# Patient Record
Sex: MALE | Race: WHITE | NOT HISPANIC OR LATINO | Employment: FULL TIME | URBAN - METROPOLITAN AREA
[De-identification: names, ages, dates, MRNs, and addresses within clinical notes are randomized per-mention and may not be internally consistent; named-entity substitution may affect disease eponyms.]

---

## 2017-02-06 ENCOUNTER — ALLSCRIPTS OFFICE VISIT (OUTPATIENT)
Dept: OTHER | Facility: OTHER | Age: 52
End: 2017-02-06

## 2017-02-17 ENCOUNTER — ALLSCRIPTS OFFICE VISIT (OUTPATIENT)
Dept: OTHER | Facility: OTHER | Age: 52
End: 2017-02-17

## 2017-06-16 ENCOUNTER — ALLSCRIPTS OFFICE VISIT (OUTPATIENT)
Dept: OTHER | Facility: OTHER | Age: 52
End: 2017-06-16

## 2017-11-16 ENCOUNTER — ALLSCRIPTS OFFICE VISIT (OUTPATIENT)
Dept: OTHER | Facility: OTHER | Age: 52
End: 2017-11-16

## 2017-11-16 DIAGNOSIS — J32.9 CHRONIC SINUSITIS: ICD-10-CM

## 2017-11-16 DIAGNOSIS — H81.10 BENIGN PAROXYSMAL VERTIGO: ICD-10-CM

## 2017-11-17 NOTE — PROGRESS NOTES
Assessment    1  Benign positional vertigo (386 11) (H81 10)   2  Chronic congestion of paranasal sinus (473 9) (J32 9)    Plan  Benign positional vertigo    · Meclizine HCl - 25 MG Oral Tablet; TAKE 1 TABLET 4 TIMES DAILY AS NEEDEDFOR DIZZINESS   · [address] - PT Evaluate and Treat Co-Management  *  Status:Active  Requested for: [id number]  Care Summary provided  : Yes  Chronic congestion of paranasal sinus    · PredniSONE 20 MG Oral Tablet; 4 tabs for three days 3 tabs for three days, 2 tabsfor three days, one tab for three days, 1/2 tab for 4 days   · * XR SINUSES ROUTINE 3+ VIEWS; Status:Active; Requested for:87Caq9980;    · 1 - Finn Fitzgerald MD, Calderon Dunn  (Otolaryngology) Co-Management  *  Status: Hold For - Scheduling Requested for: 30LAF3295  Care Summary provided  : Yes    Chief Complaint  vertigo attack      History of Present Illness  HPI: pt states hi svertigo is a problem againstates it was bad this amstates his ear has been bothering him states it has been hurting all year and the meds we have been giving him has not helpedboth ears are more of a tingle  does seem to suffer from chronic congestion is his head etc      Review of Systems   Constitutional: no fever or chills, feels well, no tiredness, no recent weight loss or weight gain  ENT: no complaints of earache, no loss of hearing, no nosebleeds or nasal discharge, no sore throat or hoarseness  Cardiovascular: no complaints of slow or fast heart rate, no chest pain, no palpitations, no leg claudication or lower extremity edema  Respiratory: no complaints of shortness of breath, no wheezing or cough, no dyspnea on exertion, no orthopnea or PND  Gastrointestinal: no complaints of abdominal pain, no constipation, no nausea or vomiting, no diarrhea or bloody stools  Genitourinary: no complaints of dysuria or incontinence, no hesitancy, no nocturia, no genital lesion, no inadequacy of penile erection    Musculoskeletal: no complaints of arthralgia, no myalgia, no joint swelling or stiffness, no limb pain or swelling  Integumentary: no complaints of skin rash or lesion, no itching or dry skin, no skin wounds  Active Problems  1  Acute URI (465 9) (J06 9)   2  Backache (724 5) (M54 9)   3  BMI 26 0-26 9,adult (V85 22) (Z68 26)   4  Chest pain (786 50) (R07 9)   5  Dysfunction of left eustachian tube (381 81) (H69 82)   6  Immunization due (V05 9) (Z23)   7  Insomnia (780 52) (G47 00)   8  Never A Smoker    Past Medical History  1  History of Acute bronchitis, unspecified organism (466 0) (J20 9)   2  History of acute otitis media (V12 49) (Z86 69)   3  History of influenza (V12 09) (Z87 09)   4  History of Otalgia, unspecified laterality (388 70) (H92 09)   5  Other acute sinusitis (461 8) (J01 80)  Active Problems And Past Medical History Reviewed: The active problems and past medical history were reviewed and updated today  Family History  Family History Reviewed: The family history was reviewed and updated today  Social History     · Never A Smoker  The social history was reviewed and updated today  Current Meds   1  Lisinopril 10 MG Oral Tablet; Take 1 tablet daily Recorded   2  Melatonin 5 MG Oral Capsule; TAKE AS DIRECTED; Therapy: 20JHB7096 to Recorded   3  Pravastatin Sodium 40 MG Oral Tablet; 1 DAILY Recorded    Allergies  1  No Known Drug Allergies    Vitals   Recorded: 77PFZ2785 02:07PM   Temperature 96 4 F   Heart Rate 84   Respiration 16   Systolic 896   Diastolic 84   Height 5 ft 8 in   Weight 176 lb    BMI Calculated 26 76   BSA Calculated 1 94       Physical Exam   Constitutional  General appearance: No acute distress, well appearing and well nourished  Eyes  Conjunctiva and lids: No swelling, erythema, or discharge  Pupils and irises: Equal, round and reactive to light     Ears, Nose, Mouth, and Throat  External inspection of ears and nose: Normal    Pulmonary  Auscultation of lungs: Clear to auscultation, equal breath sounds bilaterally, no wheezes, no rales, no rhonci  Cardiovascular  Auscultation of heart: Normal rate and rhythm, normal S1 and S2, without murmurs  Abdomen  Abdomen: Non-tender, no masses  Lymphatic  Palpation of lymph nodes in neck: No lymphadenopathy  Musculoskeletal  Gait and station: Normal    Neurologic  Cranial nerves: Cranial nerves 2-12 intact           Signatures   Electronically signed by : Ángela Weiner DO; Nov 16 2017  2:29PM EST                       (Author)

## 2017-11-20 ENCOUNTER — APPOINTMENT (OUTPATIENT)
Dept: RADIOLOGY | Facility: CLINIC | Age: 52
End: 2017-11-20
Payer: COMMERCIAL

## 2017-11-20 DIAGNOSIS — J32.9 CHRONIC SINUSITIS: ICD-10-CM

## 2017-11-20 PROCEDURE — 70220 X-RAY EXAM OF SINUSES: CPT

## 2018-01-12 VITALS
BODY MASS INDEX: 26.52 KG/M2 | WEIGHT: 175 LBS | TEMPERATURE: 95.8 F | RESPIRATION RATE: 20 BRPM | SYSTOLIC BLOOD PRESSURE: 122 MMHG | DIASTOLIC BLOOD PRESSURE: 84 MMHG | HEIGHT: 68 IN | HEART RATE: 68 BPM

## 2018-01-13 ENCOUNTER — ALLSCRIPTS OFFICE VISIT (OUTPATIENT)
Dept: OTHER | Facility: OTHER | Age: 53
End: 2018-01-13

## 2018-01-13 VITALS
BODY MASS INDEX: 26.07 KG/M2 | HEART RATE: 72 BPM | SYSTOLIC BLOOD PRESSURE: 116 MMHG | HEIGHT: 68 IN | DIASTOLIC BLOOD PRESSURE: 80 MMHG | TEMPERATURE: 97.2 F | RESPIRATION RATE: 16 BRPM | WEIGHT: 172 LBS

## 2018-01-13 VITALS
RESPIRATION RATE: 22 BRPM | TEMPERATURE: 97.4 F | HEART RATE: 78 BPM | HEIGHT: 68 IN | BODY MASS INDEX: 25.91 KG/M2 | DIASTOLIC BLOOD PRESSURE: 70 MMHG | SYSTOLIC BLOOD PRESSURE: 110 MMHG | WEIGHT: 171 LBS

## 2018-01-13 VITALS
HEART RATE: 84 BPM | RESPIRATION RATE: 16 BRPM | HEIGHT: 68 IN | WEIGHT: 176 LBS | SYSTOLIC BLOOD PRESSURE: 122 MMHG | BODY MASS INDEX: 26.67 KG/M2 | DIASTOLIC BLOOD PRESSURE: 84 MMHG | TEMPERATURE: 96.4 F

## 2018-01-13 NOTE — PROGRESS NOTES
Assessment    1  Acute bronchitis, unspecified organism (466 0) (J20 9)   2  Otalgia, unspecified laterality (388 70) (H92 09)   3  Immunization due (V05 9) (Z23)    Plan  Acute bronchitis, unspecified organism    · Follow Up if Not Better Evaluation and Treatment  Follow-up  Status: Complete  Done:  81AZR2993 08:50PM   · Avoid sun exposure ; Status:Complete;   Done: 91PKK2138   · Drink plenty of fluids while you are not feeling well ; Status:Complete;   Done: 12ZOF9006   · Shared Decision Making Aid given; Status:Complete;   Done: 06LCZ5152  Acute bronchitis, unspecified organism, Otalgia, unspecified laterality    · Promethazine-Codeine 6 25-10 MG/5ML Oral Syrup; TAKE 5 ML BY MOUTH AT  BEDTIME AS NEEDED   · Zithromax Z-Matt 250 MG Oral Tablet (Azithromycin); Take as directed  Immunization due    · Adacel 5-2-15 5 LF-MCG/0 5 Intramuscular Suspension    Discussion/Summary    Mucinex DM  sees a cardiologist for htn/chol f/u per pt  The patient was counseled regarding risk factor reductions, impressions, risks and benefits of treatment options  Chief Complaint  pt c/o cough and sore throat for the past two days  er/cma  History of Present Illness  HPI: 1 grandchild, 3m old  cough  st  2d  severe  raw  mucus is clear  no fever  no chills  aches       Review of Systems    Cardiovascular: no chest pain  Neurological: no dizziness  Active Problems    1  Backache (724 5) (M54 9)   2  Chest pain (786 50) (R07 9)   3  Influenza (487 1) (J11 1)    Family History  Family History Reviewed: The family history was reviewed and updated today  Social History    · Never A Smoker  The social history was reviewed and is unchanged  Current Meds   1  Lisinopril 10 MG Oral Tablet; Take 1 tablet daily Recorded   2  Pravastatin Sodium 40 MG Oral Tablet; 1 DAILY Recorded    Allergies    1   No Known Drug Allergies    Vitals   Recorded: 88MET7690 04:18PM   Temperature 98 3 F   Heart Rate 80   Respiration 20 Systolic 745   Diastolic 80   Height 5 ft 8 in   Weight 174 lb    BMI Calculated 26 46   BSA Calculated 1 93     Physical Exam    Constitutional   General appearance: No acute distress, well appearing and well nourished  Eyes   Conjunctiva and lids: No swelling, erythema, or discharge  Pupils and irises: Equal, round and reactive to light  Ears, Nose, Mouth, and Throat   External inspection of ears and nose: Normal     Otoscopic examination: Abnormal   The right tympanic membrane was not red, was not bulging and had an intact light reflex  The left tympanic membrane had a diminished light reflex, but was not red and was not bulging  Nasal mucosa, septum, and turbinates: Normal without edema or erythema  Oropharynx: Normal with no erythema, edema, exudate or lesions  Pulmonary   Respiratory effort: Abnormal   Respiratory Findings: barky cough, but no audible wheezing  Auscultation of lungs: Clear to auscultation, equal breath sounds bilaterally, no wheezes, no rales, no rhonci  Cardiovascular   Auscultation of heart: Normal rate and rhythm, normal S1 and S2, without murmurs  Examination of extremities for edema and/or varicosities: Normal     Carotid pulses: Normal     Abdomen   Abdomen: Non-tender, no masses  Lymphatic   Palpation of lymph nodes in neck: No lymphadenopathy  Musculoskeletal   Gait and station: Normal     Digits and nails: Normal without clubbing or cyanosis  Inspection/palpation of joints, bones, and muscles: Normal     Skin   Skin and subcutaneous tissue: Normal without rashes or lesions      Psychiatric   Mood and affect: Normal          Signatures   Electronically signed by : Nela Rogers DO; Jan 25 2016  8:51PM EST                       (Author)

## 2018-01-14 NOTE — PROGRESS NOTES
Assessment   1  Influenza-like illness (799 89) (R69)   2  BMI 26 0-26 9,adult (V85 22) (Z68 26)    Plan   Influenza-like illness    · Oseltamivir Phosphate 75 MG Oral Capsule (Tamiflu); TAKE 1 CAPSULE TWICE    DAILY   · Drink at least 6 glasses of water or juice a day ; Status:Complete;   Done: 61IKV3669    08:52PM   · Good hand washing is one of the best ways to control the spread of germs ;    Status:Complete;   Done: 74PJE2829 08:52PM   · The following home treatments may soothe a sore throat ; Status:Complete;   Done:    66NMK4286 08:52PM   · You may slowly resume your normal level of activity once you feel better ;    Status:Complete;   Done: 25LOW9378 08:52PM    Discussion/Summary   The patient was counseled regarding risk factor reductions,-- impressions,-- risks and benefits of treatment options  Possible side effects of new medications were reviewed with the patient/guardian today  The treatment plan was reviewed with the patient/guardian  The patient/guardian understands and agrees with the treatment plan      Provider Comments   Provider Comments:    otc cold meds bp      Chief Complaint   Sinus congestion, body achy and back pain  History of Present Illness   HPI: sweats aches badly ok sob wheezing cough st head congestion bp meds              Review of Systems        Cardiovascular: no chest pain  Neurological: no fainting  Active Problems   1  Acute URI (465 9) (J06 9)   2  Backache (724 5) (M54 9)   3  Benign positional vertigo (386 11) (H81 10)   4  BMI 26 0-26 9,adult (V85 22) (Z68 26)   5  Chest pain (786 50) (R07 9)   6  Chronic congestion of paranasal sinus (473 9) (J32 9)   7  Dysfunction of left eustachian tube (381 81) (H69 82)   8  Immunization due (V05 9) (Z23)   9  Insomnia (780 52) (G47 00)   10  Never A Smoker    Past Medical History   1  History of Acute bronchitis, unspecified organism (466 0) (J20 9)   2  History of acute otitis media (V12 49) (Z86 69)   3   History of influenza (V12 09) (Z87 09)   4  History of Otalgia, unspecified laterality (388 70) (H92 09)   5  Other acute sinusitis (461 8) (J01 80)    Social History    · Never A Smoker  The social history was reviewed and updated today  Current Meds    1  Lisinopril 10 MG Oral Tablet; Take 1 tablet daily Recorded   2  Melatonin 5 MG Oral Capsule; TAKE AS DIRECTED; Therapy: 69HME8613 to Recorded   3  Pravastatin Sodium 40 MG Oral Tablet; 1 DAILY Recorded    Allergies   1  No Known Drug Allergies    Vitals    Recorded: 60PNB0388 11:25AM   Temperature 98 7 F   Heart Rate 78   Respiration 22   Systolic 84   Diastolic 62   Height 5 ft 8 in   Weight 172 lb    BMI Calculated 26 15   BSA Calculated 1 92     Physical Exam        Constitutional      General appearance: No acute distress, well appearing and well nourished  Eyes      Conjunctiva and lids: No swelling, erythema, or discharge  Pupils and irises: Equal, round and reactive to light  Ears, Nose, Mouth, and Throat      External inspection of ears and nose: Normal        Otoscopic examination: Tympanic membrance translucent with normal light reflex  Canals patent without erythema  Nasal mucosa, septum, and turbinates: Normal without edema or erythema  Oropharynx: Normal with no erythema, edema, exudate or lesions  Pulmonary      Respiratory effort: No increased work of breathing or signs of respiratory distress  Auscultation of lungs: Clear to auscultation, equal breath sounds bilaterally, no wheezes, no rales, no rhonci  Cardiovascular      Auscultation of heart: Normal rate and rhythm, normal S1 and S2, without murmurs  Examination of extremities for edema and/or varicosities: Normal        Carotid pulses: Normal        Abdomen      Abdomen: Non-tender, no masses  Lymphatic      Palpation of lymph nodes in neck: No lymphadenopathy         Musculoskeletal      Gait and station: Normal        Digits and nails: Normal without clubbing or cyanosis  Skin      Skin and subcutaneous tissue: Normal without rashes or lesions  Psychiatric      Mood and affect: Normal           Message   Return to work or school:    Erick Cheng is under my professional care  He was seen in my office on 1/13/18  Excuse from work on 1/13 and 1/15/18                Signatures    Electronically signed by : Walter Vidal DO; Jan 13 2018  8:53PM EST                       (Author)

## 2018-01-23 VITALS
HEART RATE: 78 BPM | RESPIRATION RATE: 22 BRPM | DIASTOLIC BLOOD PRESSURE: 62 MMHG | TEMPERATURE: 98.7 F | BODY MASS INDEX: 26.07 KG/M2 | WEIGHT: 172 LBS | HEIGHT: 68 IN | SYSTOLIC BLOOD PRESSURE: 84 MMHG

## 2018-02-26 NOTE — MISCELLANEOUS
Message  Return to work or school:   Marcelo Olivares is under my professional care  He was seen in my office on 1/13/18  Excuse from work on 1/13 and 1/15/18               Signatures   Electronically signed by : Henry Brown DO; Jan 13 2018  8:53PM EST                       (Author)

## 2019-04-29 ENCOUNTER — OFFICE VISIT (OUTPATIENT)
Dept: PODIATRY | Facility: CLINIC | Age: 54
End: 2019-04-29
Payer: COMMERCIAL

## 2019-04-29 VITALS
RESPIRATION RATE: 16 BRPM | BODY MASS INDEX: 26.52 KG/M2 | HEART RATE: 70 BPM | HEIGHT: 68 IN | DIASTOLIC BLOOD PRESSURE: 81 MMHG | WEIGHT: 175 LBS | SYSTOLIC BLOOD PRESSURE: 117 MMHG

## 2019-04-29 DIAGNOSIS — M79.672 LEFT FOOT PAIN: ICD-10-CM

## 2019-04-29 DIAGNOSIS — M20.42 HAMMER TOE OF LEFT FOOT: ICD-10-CM

## 2019-04-29 DIAGNOSIS — M21.962 ACQUIRED DEFORMITY OF LEFT FOOT: Primary | ICD-10-CM

## 2019-04-29 DIAGNOSIS — B07.0 PLANTAR WARTS: ICD-10-CM

## 2019-04-29 PROCEDURE — 99203 OFFICE O/P NEW LOW 30 MIN: CPT | Performed by: PODIATRIST

## 2019-04-29 PROCEDURE — 73620 X-RAY EXAM OF FOOT: CPT | Performed by: PODIATRIST

## 2019-04-29 RX ORDER — PRAVASTATIN SODIUM 80 MG/1
TABLET ORAL
Refills: 3 | COMMUNITY
Start: 2019-04-09

## 2019-04-29 RX ORDER — LISINOPRIL 10 MG/1
1 TABLET ORAL DAILY
COMMUNITY

## 2019-05-06 ENCOUNTER — OFFICE VISIT (OUTPATIENT)
Dept: PODIATRY | Facility: CLINIC | Age: 54
End: 2019-05-06
Payer: COMMERCIAL

## 2019-05-06 VITALS — HEIGHT: 68 IN | RESPIRATION RATE: 17 BRPM | BODY MASS INDEX: 26.52 KG/M2 | WEIGHT: 175 LBS

## 2019-05-06 DIAGNOSIS — M20.42 HAMMER TOE OF LEFT FOOT: ICD-10-CM

## 2019-05-06 DIAGNOSIS — M21.962 ACQUIRED DEFORMITY OF LEFT FOOT: ICD-10-CM

## 2019-05-06 DIAGNOSIS — M79.672 LEFT FOOT PAIN: ICD-10-CM

## 2019-05-06 DIAGNOSIS — B07.0 PLANTAR WARTS: ICD-10-CM

## 2019-05-06 DIAGNOSIS — L02.612 ABSCESS OF TOE OF LEFT FOOT: Primary | ICD-10-CM

## 2019-05-06 PROCEDURE — 99214 OFFICE O/P EST MOD 30 MIN: CPT | Performed by: PODIATRIST

## 2020-03-25 ENCOUNTER — TELEMEDICINE (OUTPATIENT)
Dept: FAMILY MEDICINE CLINIC | Facility: CLINIC | Age: 55
End: 2020-03-25
Payer: COMMERCIAL

## 2020-03-25 DIAGNOSIS — E78.49 OTHER HYPERLIPIDEMIA: ICD-10-CM

## 2020-03-25 DIAGNOSIS — R42 VERTIGO: Primary | ICD-10-CM

## 2020-03-25 DIAGNOSIS — I10 ESSENTIAL HYPERTENSION: ICD-10-CM

## 2020-03-25 PROCEDURE — G2012 BRIEF CHECK IN BY MD/QHP: HCPCS | Performed by: FAMILY MEDICINE

## 2020-03-25 RX ORDER — SCOLOPAMINE TRANSDERMAL SYSTEM 1 MG/1
PATCH, EXTENDED RELEASE TRANSDERMAL
Qty: 8 PATCH | Refills: 0 | Status: SHIPPED | OUTPATIENT
Start: 2020-03-25 | End: 2022-01-10

## 2020-03-25 RX ORDER — METHYLPREDNISOLONE 4 MG/1
TABLET ORAL
Qty: 21 TABLET | Refills: 0 | Status: SHIPPED | OUTPATIENT
Start: 2020-03-25 | End: 2020-03-31

## 2020-03-25 NOTE — PROGRESS NOTES
Virtual Regular Visit    Problem List Items Addressed This Visit        Active Problems    Essential hypertension    Other hyperlipidemia    Vertigo - Primary    Relevant Medications    methylPREDNISolone 4 MG tablet therapy pack    scopolamine (TRANSDERM-SCOP) 1 5 mg/3 days TD 72 hr patch               Reason for visit is illness    Encounter provider Twyla An DO    Provider located at [address]  [address] [facility]  [address]  [place] [address]      Recent Visits  No visits were found meeting these conditions  Showing recent visits within past 7 days and meeting all other requirements     Future Appointments  No visits were found meeting these conditions  Showing future appointments within next 150 days and meeting all other requirements        After connecting through [email or web address], the patient was identified by name and date of birth  Ranjit Mora was informed that this is a telemedicine visit and that the visit is being conducted through [employer] which may not be secure and therefore, might not be HIPAA-compliant  My office door was closed  No one else was in the room  He acknowledged consent and understanding of privacy and security of the video platform  The patient has agreed to participate and understands they can discontinue the visit at any time      Subjective  Ranjit Mora is a 63 y o  male with dizziness  Laying on couch    3d-4d  Dizzy  Headache  Some fever per pt  No thermometer  Some chills  No cough or sore throat   Discomfort with breathing  No wheezing  Speaking full sentences  Has vertigo, once a year  Transderm scop in patch  On lisinopril 10mg/d  On pravachol 80mg/d  Has cardiologist  No other meds  Works as an , 1m away from home    No c/d  Some retch x1  No close contacts with coronavirus    Spinning when laying down and rolling over    Left ear pain for 2y  Has been to ENT  No trouble hearing    Home bp 120/78  Pulse 56    History reviewed  No pertinent past medical history  History reviewed  No pertinent surgical history  Current Outpatient Medications   Medication Sig Dispense Refill    lisinopril (ZESTRIL) 10 mg tablet Take 1 tablet by mouth daily      methylPREDNISolone 4 MG tablet therapy pack Use as directed on package 21 tablet 0    pravastatin (PRAVACHOL) 80 mg tablet   3    scopolamine (TRANSDERM-SCOP) 1 5 mg/3 days TD 72 hr patch Apply one patch to dry skin behind ear every 3 days 8 patch 0     No current facility-administered medications for this visit  No Known Allergies    Review of Systems   Constitutional: Negative for fever  Respiratory: Negative for shortness of breath  Neurological: Positive for dizziness  Physical Exam   Constitutional: He appears well-developed  No distress  Eyes: No scleral icterus  Neck:   No apparent neck stiffness   Pulmonary/Chest: Effort normal  No stridor  No respiratory distress  Speaks full sentences   Skin: He is not diaphoretic  No pallor  Psychiatric: He has a normal mood and affect  Thought content normal         1  Vertigo  -     methylPREDNISolone 4 MG tablet therapy pack; Use as directed on package  -     scopolamine (TRANSDERM-SCOP) 1 5 mg/3 days TD 72 hr patch; Apply one patch to dry skin behind ear every 3 days    2  Essential hypertension    3  Other hyperlipidemia      F/u if no better  Stress test 2y ago and echo last year normal, Dr Meghan Alfaro in [facility] if does get severe CP    Consider balance center referral in future if no better or for prevention, offered to pt when ready/safe    htn stable, cont meds and monitoring  On statin, seems to tolerate, gets from?  Cardiologist?

## 2021-04-23 ENCOUNTER — APPOINTMENT (EMERGENCY)
Dept: RADIOLOGY | Facility: HOSPITAL | Age: 56
End: 2021-04-23
Payer: COMMERCIAL

## 2021-04-23 ENCOUNTER — HOSPITAL ENCOUNTER (EMERGENCY)
Facility: HOSPITAL | Age: 56
Discharge: HOME | End: 2021-04-23
Attending: EMERGENCY MEDICINE | Admitting: EMERGENCY MEDICINE
Payer: COMMERCIAL

## 2021-04-23 VITALS
OXYGEN SATURATION: 98 % | HEART RATE: 68 BPM | DIASTOLIC BLOOD PRESSURE: 72 MMHG | RESPIRATION RATE: 18 BRPM | BODY MASS INDEX: 27.67 KG/M2 | SYSTOLIC BLOOD PRESSURE: 134 MMHG | TEMPERATURE: 99.4 F | WEIGHT: 182 LBS

## 2021-04-23 DIAGNOSIS — N39.0 UTI (URINARY TRACT INFECTION): ICD-10-CM

## 2021-04-23 DIAGNOSIS — R10.9 ABDOMINAL PAIN: Primary | ICD-10-CM

## 2021-04-23 LAB
ALBUMIN SERPL BCP-MCNC: 3.5 G/DL (ref 3.5–5)
ALP SERPL-CCNC: 73 U/L (ref 46–116)
ALT SERPL W P-5'-P-CCNC: 41 U/L (ref 12–78)
ANION GAP SERPL CALCULATED.3IONS-SCNC: 8 MMOL/L (ref 4–13)
AST SERPL W P-5'-P-CCNC: 20 U/L (ref 5–45)
BACTERIA UR QL AUTO: ABNORMAL /HPF
BASOPHILS # BLD AUTO: 0.03 THOUSANDS/ΜL (ref 0–0.1)
BASOPHILS NFR BLD AUTO: 1 % (ref 0–1)
BILIRUB SERPL-MCNC: 1.62 MG/DL (ref 0.2–1)
BILIRUB UR QL STRIP: NEGATIVE
BUN SERPL-MCNC: 18 MG/DL (ref 5–25)
CALCIUM SERPL-MCNC: 8.3 MG/DL (ref 8.3–10.1)
CHLORIDE SERPL-SCNC: 105 MMOL/L (ref 100–108)
CLARITY UR: CLEAR
CO2 SERPL-SCNC: 26 MMOL/L (ref 21–32)
COLOR UR: YELLOW
CREAT SERPL-MCNC: 1.12 MG/DL (ref 0.6–1.3)
EOSINOPHIL # BLD AUTO: 0.21 THOUSAND/ΜL (ref 0–0.61)
EOSINOPHIL NFR BLD AUTO: 3 % (ref 0–6)
ERYTHROCYTE [DISTWIDTH] IN BLOOD BY AUTOMATED COUNT: 12.2 % (ref 11.6–15.1)
GFR SERPL CREATININE-BSD FRML MDRD: 73 ML/MIN/1.73SQ M
GLUCOSE SERPL-MCNC: 99 MG/DL (ref 65–140)
GLUCOSE UR STRIP-MCNC: NEGATIVE MG/DL
HCT VFR BLD AUTO: 41.8 % (ref 36.5–49.3)
HGB BLD-MCNC: 14.4 G/DL (ref 12–17)
HGB UR QL STRIP.AUTO: ABNORMAL
IMM GRANULOCYTES # BLD AUTO: 0.04 THOUSAND/UL (ref 0–0.2)
IMM GRANULOCYTES NFR BLD AUTO: 1 % (ref 0–2)
KETONES UR STRIP-MCNC: NEGATIVE MG/DL
LEUKOCYTE ESTERASE UR QL STRIP: ABNORMAL
LIPASE SERPL-CCNC: 134 U/L (ref 73–393)
LYMPHOCYTES # BLD AUTO: 1.99 THOUSANDS/ΜL (ref 0.6–4.47)
LYMPHOCYTES NFR BLD AUTO: 32 % (ref 14–44)
MAGNESIUM SERPL-MCNC: 1.8 MG/DL (ref 1.6–2.6)
MCH RBC QN AUTO: 30 PG (ref 26.8–34.3)
MCHC RBC AUTO-ENTMCNC: 34.4 G/DL (ref 31.4–37.4)
MCV RBC AUTO: 87 FL (ref 82–98)
MONOCYTES # BLD AUTO: 0.79 THOUSAND/ΜL (ref 0.17–1.22)
MONOCYTES NFR BLD AUTO: 13 % (ref 4–12)
NEUTROPHILS # BLD AUTO: 3.25 THOUSANDS/ΜL (ref 1.85–7.62)
NEUTS SEG NFR BLD AUTO: 50 % (ref 43–75)
NITRITE UR QL STRIP: NEGATIVE
NON-SQ EPI CELLS URNS QL MICRO: ABNORMAL /HPF
NRBC BLD AUTO-RTO: 0 /100 WBCS
PH UR STRIP.AUTO: 6 [PH]
PLATELET # BLD AUTO: 257 THOUSANDS/UL (ref 149–390)
PMV BLD AUTO: 9.4 FL (ref 8.9–12.7)
POTASSIUM SERPL-SCNC: 3.7 MMOL/L (ref 3.5–5.3)
PROT SERPL-MCNC: 6.8 G/DL (ref 6.4–8.2)
PROT UR STRIP-MCNC: NEGATIVE MG/DL
RBC # BLD AUTO: 4.8 MILLION/UL (ref 3.88–5.62)
RBC #/AREA URNS AUTO: ABNORMAL /HPF
SODIUM SERPL-SCNC: 139 MMOL/L (ref 136–145)
SP GR UR STRIP.AUTO: 1.02 (ref 1–1.03)
UROBILINOGEN UR QL STRIP.AUTO: 2 E.U./DL
WBC # BLD AUTO: 6.31 THOUSAND/UL (ref 4.31–10.16)
WBC #/AREA URNS AUTO: ABNORMAL /HPF

## 2021-04-23 PROCEDURE — 87086 URINE CULTURE/COLONY COUNT: CPT | Performed by: EMERGENCY MEDICINE

## 2021-04-23 PROCEDURE — 99284 EMERGENCY DEPT VISIT MOD MDM: CPT

## 2021-04-23 PROCEDURE — 83735 ASSAY OF MAGNESIUM: CPT | Performed by: EMERGENCY MEDICINE

## 2021-04-23 PROCEDURE — 36415 COLL VENOUS BLD VENIPUNCTURE: CPT

## 2021-04-23 PROCEDURE — 80053 COMPREHEN METABOLIC PANEL: CPT

## 2021-04-23 PROCEDURE — 81001 URINALYSIS AUTO W/SCOPE: CPT | Performed by: EMERGENCY MEDICINE

## 2021-04-23 PROCEDURE — G1004 CDSM NDSC: HCPCS

## 2021-04-23 PROCEDURE — 96374 THER/PROPH/DIAG INJ IV PUSH: CPT

## 2021-04-23 PROCEDURE — 85025 COMPLETE CBC W/AUTO DIFF WBC: CPT

## 2021-04-23 PROCEDURE — 83690 ASSAY OF LIPASE: CPT

## 2021-04-23 PROCEDURE — 74177 CT ABD & PELVIS W/CONTRAST: CPT

## 2021-04-23 PROCEDURE — 99284 EMERGENCY DEPT VISIT MOD MDM: CPT | Performed by: EMERGENCY MEDICINE

## 2021-04-23 PROCEDURE — 96361 HYDRATE IV INFUSION ADD-ON: CPT

## 2021-04-23 RX ORDER — KETOROLAC TROMETHAMINE 30 MG/ML
15 INJECTION, SOLUTION INTRAMUSCULAR; INTRAVENOUS ONCE
Status: COMPLETED | OUTPATIENT
Start: 2021-04-23 | End: 2021-04-23

## 2021-04-23 RX ORDER — CEPHALEXIN 500 MG/1
500 CAPSULE ORAL EVERY 12 HOURS SCHEDULED
Qty: 10 CAPSULE | Refills: 0 | Status: SHIPPED | OUTPATIENT
Start: 2021-04-23 | End: 2021-04-28

## 2021-04-23 RX ORDER — CEPHALEXIN 500 MG/1
500 CAPSULE ORAL ONCE
Status: COMPLETED | OUTPATIENT
Start: 2021-04-23 | End: 2021-04-23

## 2021-04-23 RX ADMIN — SODIUM CHLORIDE 1000 ML: 0.9 INJECTION, SOLUTION INTRAVENOUS at 15:58

## 2021-04-23 RX ADMIN — CEPHALEXIN 500 MG: 500 CAPSULE ORAL at 17:41

## 2021-04-23 RX ADMIN — IOHEXOL 100 ML: 350 INJECTION, SOLUTION INTRAVENOUS at 16:11

## 2021-04-23 RX ADMIN — KETOROLAC TROMETHAMINE 15 MG: 30 INJECTION, SOLUTION INTRAMUSCULAR at 15:57

## 2021-04-23 NOTE — ED PROVIDER NOTES
History  Chief Complaint   Patient presents with    Abdominal Pain     Co of abd pain  intermittent diarrhea and vomiting for the past 2 weeks 2 fridays ago after J&J vaccine  Vomiting and nausea improving  Pain and diarrhea persisted  Pt in the ER with c/o left sided abd pain since Tuesday, associated with nausea/vomiting/non bloody diarrhea  He denies back pain or urinary symptoms  He denies sick contacts  No relief with 1 tab of Imodium taken today  Pt with c/o myalgias and fevers 2 days after he got the J & J vaccine 2 wks ago, but none since  He has a prior hx of HTN/hyperlipidemia  History provided by:  Patient   used: No    Abdominal Pain  Pain location:  LLQ  Pain quality: aching    Pain radiates to:  Does not radiate  Pain severity:  Moderate  Onset quality:  Sudden  Timing:  Constant  Progression:  Worsening  Chronicity:  New  Relieved by:  None tried  Worsened by:  Nothing  Ineffective treatments:  None tried  Associated symptoms: diarrhea, nausea and vomiting    Associated symptoms: no chest pain, no chills, no constipation, no cough, no dysuria, no fever, no hematuria and no shortness of breath        Prior to Admission Medications   Prescriptions Last Dose Informant Patient Reported? Taking?   lisinopril (ZESTRIL) 10 mg tablet 4/23/2021 at 0700  Yes Yes   Sig: Take 1 tablet by mouth daily   pravastatin (PRAVACHOL) 80 mg tablet 4/23/2021 at 0700  Yes Yes   scopolamine (TRANSDERM-SCOP) 1 5 mg/3 days TD 72 hr patch Not Taking at Unknown time  No No   Sig: Apply one patch to dry skin behind ear every 3 days   Patient not taking: Reported on 4/23/2021      Facility-Administered Medications: None       Past Medical History:   Diagnosis Date    High cholesterol     Hypertension        Past Surgical History:   Procedure Laterality Date    GALLBLADDER SURGERY      LEG SURGERY Right        History reviewed  No pertinent family history    I have reviewed and agree with the history as documented  E-Cigarette/Vaping    E-Cigarette Use Never User      E-Cigarette/Vaping Substances     Social History     Tobacco Use    Smoking status: Never Smoker    Smokeless tobacco: Never Used   Substance Use Topics    Alcohol use: Yes     Comment: occ    Drug use: Never       Review of Systems   Constitutional: Negative for chills and fever  HENT: Negative for congestion, drooling and trouble swallowing  Eyes: Negative for pain, redness and itching  Respiratory: Negative for cough, shortness of breath and wheezing  Cardiovascular: Negative for chest pain and palpitations  Gastrointestinal: Positive for abdominal pain, diarrhea, nausea and vomiting  Negative for constipation  Genitourinary: Negative for dysuria, flank pain, hematuria and urgency  Musculoskeletal: Negative for back pain  Skin: Negative for color change and rash  Psychiatric/Behavioral: Negative for agitation and confusion  All other systems reviewed and are negative  Physical Exam  Physical Exam  Vitals signs and nursing note reviewed  Constitutional:       Appearance: He is well-developed  HENT:      Head: Normocephalic and atraumatic  Eyes:      Pupils: Pupils are equal, round, and reactive to light  Cardiovascular:      Rate and Rhythm: Normal rate and regular rhythm  Heart sounds: Normal heart sounds  Pulmonary:      Effort: Pulmonary effort is normal       Breath sounds: Normal breath sounds  Abdominal:      General: Bowel sounds are normal  There is no distension  Palpations: Abdomen is soft  There is no mass  Tenderness: There is abdominal tenderness in the suprapubic area and left lower quadrant  There is no guarding or rebound  Hernia: No hernia is present  Skin:     General: Skin is warm and dry  Capillary Refill: Capillary refill takes less than 2 seconds  Neurological:      General: No focal deficit present        Mental Status: He is alert and oriented to person, place, and time  Psychiatric:         Behavior: Behavior normal          Thought Content: Thought content normal          Judgment: Judgment normal          Vital Signs  ED Triage Vitals [04/23/21 1519]   Temperature Pulse Respirations Blood Pressure SpO2   99 4 °F (37 4 °C) 86 20 139/84 99 %      Temp Source Heart Rate Source Patient Position - Orthostatic VS BP Location FiO2 (%)   Tympanic Monitor Lying Left arm --      Pain Score       5           Vitals:    04/23/21 1519 04/23/21 1700 04/23/21 1730   BP: 139/84 121/74 134/72   Pulse: 86 76 68   Patient Position - Orthostatic VS: Lying Lying          Visual Acuity      ED Medications  Medications   sodium chloride 0 9 % bolus 1,000 mL (0 mL Intravenous Stopped 4/23/21 1743)   ketorolac (TORADOL) injection 15 mg (15 mg Intravenous Given 4/23/21 1557)   iohexol (OMNIPAQUE) 350 MG/ML injection (SINGLE-DOSE) 100 mL (100 mL Intravenous Given 4/23/21 1611)   cephalexin (KEFLEX) capsule 500 mg (500 mg Oral Given 4/23/21 1741)       Diagnostic Studies  Results Reviewed     Procedure Component Value Units Date/Time    Urine culture [[id number]] Collected: 04/23/21 1600    Lab Status:  In process Specimen: Urine, Clean Catch Updated: 04/23/21 1730    Urine Microscopic [[id number]]  (Abnormal) Collected: 04/23/21 1600    Lab Status: Final result Specimen: Urine, Clean Catch Updated: 04/23/21 1622     RBC, UA 4-10 /hpf      WBC, UA 4-10 /hpf      Epithelial Cells None Seen /hpf      Bacteria, UA Occasional /hpf     Magnesium [[id number]]  (Normal) Collected: 04/23/21 1530    Lab Status: Final result Specimen: Blood from Arm, Right Updated: 04/23/21 1618     Magnesium 1 8 mg/dL     UA (URINE) with reflex to Scope [[id number]]  (Abnormal) Collected: 04/23/21 1600    Lab Status: Final result Specimen: Urine, Clean Catch Updated: 04/23/21 1606     Color, UA Yellow     Clarity, UA Clear     Specific Gravity, UA 1 025     pH, UA 6 0     Leukocytes, UA Small Nitrite, UA Negative     Protein, UA Negative mg/dl      Glucose, UA Negative mg/dl      Ketones, UA Negative mg/dl      Urobilinogen, UA 2 0 E U /dl      Bilirubin, UA Negative     Blood, UA Moderate    Comprehensive metabolic panel [[id number]]  (Abnormal) Collected: 04/23/21 1530    Lab Status: Final result Specimen: Blood from Arm, Right Updated: 04/23/21 1555     Sodium 139 mmol/L      Potassium 3 7 mmol/L      Chloride 105 mmol/L      CO2 26 mmol/L      ANION GAP 8 mmol/L      BUN 18 mg/dL      Creatinine 1 12 mg/dL      Glucose 99 mg/dL      Calcium 8 3 mg/dL      AST 20 U/L      ALT 41 U/L      Alkaline Phosphatase 73 U/L      Total Protein 6 8 g/dL      Albumin 3 5 g/dL      Total Bilirubin 1 62 mg/dL      eGFR 73 ml/min/1 73sq m     Narrative:      [facility] guidelines for Chronic Kidney Disease (CKD):     Stage 1 with normal or high GFR (GFR > 90 mL/min/1 73 square meters)    Stage 2 Mild CKD (GFR = 60-89 mL/min/1 73 square meters)    Stage 3A Moderate CKD (GFR = 45-59 mL/min/1 73 square meters)    Stage 3B Moderate CKD (GFR = 30-44 mL/min/1 73 square meters)    Stage 4 Severe CKD (GFR = 15-29 mL/min/1 73 square meters)    Stage 5 End Stage CKD (GFR <15 mL/min/1 73 square meters)  Note: GFR calculation is accurate only with a steady state creatinine    Lipase [[id number]]  (Normal) Collected: 04/23/21 1530    Lab Status: Final result Specimen: Blood from Arm, Right Updated: 04/23/21 1555     Lipase 134 u/L     CBC and differential [[id number]]  (Abnormal) Collected: 04/23/21 1530    Lab Status: Final result Specimen: Blood from Arm, Right Updated: 04/23/21 1536     WBC 6 31 Thousand/uL      RBC 4 80 Million/uL      Hemoglobin 14 4 g/dL      Hematocrit 41 8 %      MCV 87 fL      MCH 30 0 pg      MCHC 34 4 g/dL      RDW 12 2 %      MPV 9 4 fL      Platelets 888 Thousands/uL      nRBC 0 /100 WBCs      Neutrophils Relative 50 %      Immat GRANS % 1 %      Lymphocytes Relative 32 % Monocytes Relative 13 %      Eosinophils Relative 3 %      Basophils Relative 1 %      Neutrophils Absolute 3 25 Thousands/µL      Immature Grans Absolute 0 04 Thousand/uL      Lymphocytes Absolute 1 99 Thousands/µL      Monocytes Absolute 0 79 Thousand/µL      Eosinophils Absolute 0 21 Thousand/µL      Basophils Absolute 0 03 Thousands/µL                  CT abdomen pelvis with contrast   Final Result by Maria Luisa Perales MD (04/23 1658)      No acute CT findings  Workstation performed: [id number]                    Procedures  Procedures         ED Course                                           MDM  Number of Diagnoses or Management Options  Abdominal pain: new and requires workup  UTI (urinary tract infection): new and requires workup  Diagnosis management comments: Pt in the ER with c/o abd pain  Labs/CT reviewed  No acute intraabdominal pathology identified  +UTI - will start Keflex  Will d/c to home with outpt f/u with PCP and urology  Pt agrees with plan  Amount and/or Complexity of Data Reviewed  Clinical lab tests: ordered  Tests in the radiology section of CPT®: ordered and reviewed    Risk of Complications, Morbidity, and/or Mortality  Presenting problems: high  Diagnostic procedures: high  Management options: high    Patient Progress  Patient progress: improved      Disposition  Final diagnoses:   Abdominal pain   UTI (urinary tract infection)     Time reflects when diagnosis was documented in both MDM as applicable and the Disposition within this note     Time User Action Codes Description Comment    4/23/2021  5:16 PM [place] Bump O Add [R10 9] Abdominal pain     4/23/2021  5:17 PM [place] Bump O Add [N39 0] UTI (urinary tract infection)       ED Disposition     ED Disposition Condition Date/Time Comment    Discharge Stable Fri Apr 23, 2021  5:16 PM [facility] discharge to home/self care              Follow-up Information     Follow up With Specialties Details Why Contact Info Additional Information    Grzegorz Noyola DO Family Medicine, Wound Care Schedule an appointment as soon as possible for a visit in 2 days for follow up [facility] [address]       Bandar Presley MD Urology Schedule an appointment as soon as possible for a visit in 2 days for follow up [address] [facility] Schedule an appointment as soon as possible for a visit in 2 days for follow up [address]  [id number] [address] [facility] [place] [facility], [address] [place], [place], [address] [phone]          Discharge Medication List as of 4/23/2021  5:34 PM      START taking these medications    Details   cephalexin (KEFLEX) 500 mg capsule Take 1 capsule (500 mg total) by mouth every 12 (twelve) hours for 5 days, Starting Fri 4/23/2021, Until Wed 4/28/2021, Normal         CONTINUE these medications which have NOT CHANGED    Details   lisinopril (ZESTRIL) 10 mg tablet Take 1 tablet by mouth daily, Historical Med      pravastatin (PRAVACHOL) 80 mg tablet Starting Tue 4/9/2019, Historical Med      scopolamine (TRANSDERM-SCOP) 1 5 mg/3 days TD 72 hr patch Apply one patch to dry skin behind ear every 3 days, Normal           No discharge procedures on file      PDMP Review     None          ED Provider  Electronically Signed by           Wisam Falcon DO  04/24/21 5913

## 2021-04-23 NOTE — DISCHARGE INSTRUCTIONS
Return to the ER for further concerns or worsening symptoms  Follow up with your primary care physician, urology and gastroenterology in 1-2 days  Take medication as prescribed

## 2021-04-25 LAB — BACTERIA UR CULT: NORMAL

## 2021-04-26 ENCOUNTER — VBI (OUTPATIENT)
Dept: FAMILY MEDICINE CLINIC | Facility: CLINIC | Age: 56
End: 2021-04-26

## 2021-04-26 NOTE — TELEPHONE ENCOUNTER
Roseanna Islas    ED Visit Information     Ed visit date: 04/23/2021  Diagnosis Description: Abdominal pain; UTI (urinary tract infection)  In Network? Yes Verónica Munguia  Discharge status: Home  Discharged with meds ? Yes  Number of ED visits to date: 0  ED Severity:NA     Outreach Information    Outreach successful: Yes 3  Date letter mailed:NA  Date Finalized:04/27/2021    Care Coordination    Follow up appointment with pcp: no no  Transportation issues ?  NA    Value Base Outreach    04/26/2021 1:39 PM - [facility]  04/27/2021 10:07 AM  04/27/2021 1:38 PM

## 2021-12-20 ENCOUNTER — OFFICE VISIT (OUTPATIENT)
Dept: FAMILY MEDICINE CLINIC | Facility: CLINIC | Age: 56
End: 2021-12-20
Payer: COMMERCIAL

## 2021-12-20 VITALS
WEIGHT: 181 LBS | SYSTOLIC BLOOD PRESSURE: 118 MMHG | HEIGHT: 68 IN | HEART RATE: 94 BPM | BODY MASS INDEX: 27.43 KG/M2 | TEMPERATURE: 98.4 F | DIASTOLIC BLOOD PRESSURE: 80 MMHG | OXYGEN SATURATION: 98 % | RESPIRATION RATE: 16 BRPM

## 2021-12-20 DIAGNOSIS — B34.9 VIRAL INFECTION, UNSPECIFIED: Primary | ICD-10-CM

## 2021-12-20 PROBLEM — G47.00 INSOMNIA: Status: ACTIVE | Noted: 2017-02-06

## 2021-12-20 PROBLEM — J32.9 CHRONIC CONGESTION OF PARANASAL SINUS: Status: ACTIVE | Noted: 2017-11-16

## 2021-12-20 PROBLEM — H81.10 BENIGN POSITIONAL VERTIGO: Status: ACTIVE | Noted: 2017-11-16

## 2021-12-20 PROCEDURE — 87636 SARSCOV2 & INF A&B AMP PRB: CPT | Performed by: NURSE PRACTITIONER

## 2021-12-20 PROCEDURE — 3725F SCREEN DEPRESSION PERFORMED: CPT | Performed by: NURSE PRACTITIONER

## 2021-12-20 PROCEDURE — 3008F BODY MASS INDEX DOCD: CPT | Performed by: NURSE PRACTITIONER

## 2021-12-20 PROCEDURE — 99213 OFFICE O/P EST LOW 20 MIN: CPT | Performed by: NURSE PRACTITIONER

## 2021-12-20 PROCEDURE — 1036F TOBACCO NON-USER: CPT | Performed by: NURSE PRACTITIONER

## 2021-12-22 ENCOUNTER — TELEPHONE (OUTPATIENT)
Dept: FAMILY MEDICINE CLINIC | Facility: CLINIC | Age: 56
End: 2021-12-22

## 2021-12-22 LAB
FLUAV RNA RESP QL NAA+PROBE: NEGATIVE
FLUBV RNA RESP QL NAA+PROBE: NEGATIVE
SARS-COV-2 RNA RESP QL NAA+PROBE: POSITIVE

## 2022-01-08 PROBLEM — M79.672 LEFT FOOT PAIN: Status: RESOLVED | Noted: 2019-04-29 | Resolved: 2022-01-08

## 2022-01-08 PROBLEM — M21.962 ACQUIRED DEFORMITY OF LEFT FOOT: Status: RESOLVED | Noted: 2019-04-29 | Resolved: 2022-01-08

## 2022-01-08 PROBLEM — B07.0 PLANTAR WARTS: Status: RESOLVED | Noted: 2019-04-29 | Resolved: 2022-01-08

## 2022-01-08 PROBLEM — L02.612 ABSCESS OF TOE OF LEFT FOOT: Status: RESOLVED | Noted: 2019-05-06 | Resolved: 2022-01-08

## 2022-01-08 PROBLEM — M20.42 HAMMER TOE OF LEFT FOOT: Status: RESOLVED | Noted: 2019-04-29 | Resolved: 2022-01-08

## 2022-01-08 PROBLEM — R42 VERTIGO: Status: RESOLVED | Noted: 2020-03-25 | Resolved: 2022-01-08

## 2022-01-10 ENCOUNTER — OFFICE VISIT (OUTPATIENT)
Dept: FAMILY MEDICINE CLINIC | Facility: CLINIC | Age: 57
End: 2022-01-10
Payer: COMMERCIAL

## 2022-01-10 VITALS
SYSTOLIC BLOOD PRESSURE: 134 MMHG | WEIGHT: 183 LBS | BODY MASS INDEX: 27.74 KG/M2 | HEART RATE: 82 BPM | DIASTOLIC BLOOD PRESSURE: 78 MMHG | HEIGHT: 68 IN | RESPIRATION RATE: 16 BRPM | OXYGEN SATURATION: 99 % | TEMPERATURE: 98 F

## 2022-01-10 DIAGNOSIS — Z13.6 SCREENING FOR CARDIOVASCULAR, RESPIRATORY, AND GENITOURINARY DISEASES: ICD-10-CM

## 2022-01-10 DIAGNOSIS — Z00.00 HEALTHCARE MAINTENANCE: Primary | ICD-10-CM

## 2022-01-10 DIAGNOSIS — H81.10 BENIGN PAROXYSMAL POSITIONAL VERTIGO, UNSPECIFIED LATERALITY: ICD-10-CM

## 2022-01-10 DIAGNOSIS — Z13.89 SCREENING FOR CARDIOVASCULAR, RESPIRATORY, AND GENITOURINARY DISEASES: ICD-10-CM

## 2022-01-10 DIAGNOSIS — G47.00 PERSISTENT INSOMNIA: ICD-10-CM

## 2022-01-10 DIAGNOSIS — I10 ESSENTIAL HYPERTENSION: ICD-10-CM

## 2022-01-10 DIAGNOSIS — Z13.1 SCREENING FOR DIABETES MELLITUS (DM): ICD-10-CM

## 2022-01-10 DIAGNOSIS — H92.02 OTALGIA OF LEFT EAR: ICD-10-CM

## 2022-01-10 DIAGNOSIS — Z12.11 COLON CANCER SCREENING: ICD-10-CM

## 2022-01-10 DIAGNOSIS — Z13.83 SCREENING FOR CARDIOVASCULAR, RESPIRATORY, AND GENITOURINARY DISEASES: ICD-10-CM

## 2022-01-10 PROCEDURE — 1036F TOBACCO NON-USER: CPT | Performed by: FAMILY MEDICINE

## 2022-01-10 PROCEDURE — 3008F BODY MASS INDEX DOCD: CPT | Performed by: FAMILY MEDICINE

## 2022-01-10 PROCEDURE — 99396 PREV VISIT EST AGE 40-64: CPT | Performed by: FAMILY MEDICINE

## 2022-01-10 RX ORDER — TRAZODONE HYDROCHLORIDE 50 MG/1
50 TABLET ORAL
Qty: 30 TABLET | Refills: 5 | Status: SHIPPED | OUTPATIENT
Start: 2022-01-10

## 2022-01-10 NOTE — PROGRESS NOTES
Assessment/Plan:    No problem-specific Assessment & Plan notes found for this encounter  cpe  ENT eval for chronic left ear symptoms  Balance center for intermittent bppv    Try benadryl 25-50mg qhs for sleep  Trazodone if not effective     Diagnoses and all orders for this visit:    Healthcare maintenance    Colon cancer screening    Otalgia of left ear  -     Ambulatory Referral to Otolaryngology; Future    Persistent insomnia  -     traZODone (DESYREL) 50 mg tablet; Take 1 tablet (50 mg total) by mouth daily at bedtime as needed for sleep    Screening for cardiovascular, respiratory, and genitourinary diseases    Screening for diabetes mellitus (DM)    Benign paroxysmal positional vertigo, unspecified laterality  -     Ambulatory Referral to Otolaryngology; Future  -     Ambulatory referral to Physical Therapy; Future    Essential hypertension              Return if symptoms worsen or fail to improve  Subjective:      Patient ID: Hoa Cardozo is a 61 y o  male  Chief Complaint   Patient presents with    Physical Exam     mz cma       HPI  Gi in [facility]  Urologist Dr Shan Moe in [facility]  He gets htn/chol meds from him  Yearly visit planned in Feb    psa done at urologist  Getting labs next month     Regular diet  Not strict  Limits some carbs   work    No probs with depression  Most nights insomnia  No DFA but DSA    No PND  No RLS  No nocturia    Melatonin not helpful    Left ear pain, chronic, some hearing loss, years    Episodes of BPPV still    The following portions of the patient's history were reviewed and updated as appropriate: allergies, current medications, past family history, past medical history, past social history, past surgical history and problem list     Review of Systems   Constitutional: Negative for fever  Respiratory: Negative for shortness of breath  Neurological: Positive for dizziness           Current Outpatient Medications   Medication Sig Dispense Refill    lisinopril (ZESTRIL) 10 mg tablet Take 1 tablet by mouth daily      pravastatin (PRAVACHOL) 80 mg tablet   3    traZODone (DESYREL) 50 mg tablet Take 1 tablet (50 mg total) by mouth daily at bedtime as needed for sleep 30 tablet 5     No current facility-administered medications for this visit  Objective:    /78   Pulse 82   Temp 98 °F (36 7 °C)   Resp 16   Ht 5' 8" (1 727 m)   Wt 83 kg (183 lb)   SpO2 99%   BMI 27 83 kg/m²        Physical Exam  Vitals and nursing note reviewed  Constitutional:       General: He is not in acute distress  Appearance: He is well-developed  He is not ill-appearing  HENT:      Head: Normocephalic  Right Ear: Tympanic membrane normal       Left Ear: Tympanic membrane normal    Eyes:      General: No scleral icterus  Conjunctiva/sclera: Conjunctivae normal    Neck:      Vascular: No carotid bruit  Cardiovascular:      Rate and Rhythm: Normal rate and regular rhythm  Heart sounds: No murmur heard  Pulmonary:      Effort: Pulmonary effort is normal  No respiratory distress  Breath sounds: No wheezing  Abdominal:      General: There is no distension  Palpations: Abdomen is soft  Tenderness: There is no guarding  Musculoskeletal:         General: No deformity  Cervical back: Neck supple  Right lower leg: No edema  Left lower leg: No edema  Skin:     General: Skin is warm and dry  Coloration: Skin is not pale  Neurological:      Mental Status: He is alert  Motor: No weakness  Gait: Gait normal    Psychiatric:         Mood and Affect: Mood normal          Behavior: Behavior normal          Thought Content:  Thought content normal                 Verdolores Huffman, DO

## 2022-01-10 NOTE — PATIENT INSTRUCTIONS
As an option for sleep, you could try Diphenhydramine (benadryl) 25-50mg at bedtime  Another option that is considered safe is a prescription called Trazodone

## 2022-08-12 ENCOUNTER — VBI (OUTPATIENT)
Dept: ADMINISTRATIVE | Facility: OTHER | Age: 57
End: 2022-08-12

## 2022-09-09 ENCOUNTER — OFFICE VISIT (OUTPATIENT)
Dept: PODIATRY | Facility: CLINIC | Age: 57
End: 2022-09-09
Payer: COMMERCIAL

## 2022-09-09 VITALS
BODY MASS INDEX: 27.74 KG/M2 | SYSTOLIC BLOOD PRESSURE: 134 MMHG | DIASTOLIC BLOOD PRESSURE: 78 MMHG | WEIGHT: 183 LBS | HEIGHT: 68 IN | RESPIRATION RATE: 17 BRPM

## 2022-09-09 DIAGNOSIS — B35.3 TINEA PEDIS OF BOTH FEET: ICD-10-CM

## 2022-09-09 DIAGNOSIS — M79.671 PAIN IN BOTH FEET: Primary | ICD-10-CM

## 2022-09-09 DIAGNOSIS — M79.672 PAIN IN BOTH FEET: Primary | ICD-10-CM

## 2022-09-09 DIAGNOSIS — B35.1 ONYCHOMYCOSIS: ICD-10-CM

## 2022-09-09 PROCEDURE — 87220 TISSUE EXAM FOR FUNGI: CPT | Performed by: PODIATRIST

## 2022-09-09 PROCEDURE — 88305 TISSUE EXAM BY PATHOLOGIST: CPT | Performed by: STUDENT IN AN ORGANIZED HEALTH CARE EDUCATION/TRAINING PROGRAM

## 2022-09-09 PROCEDURE — 87102 FUNGUS ISOLATION CULTURE: CPT | Performed by: PODIATRIST

## 2022-09-09 PROCEDURE — 11755 BIOPSY NAIL UNIT: CPT | Performed by: PODIATRIST

## 2022-09-09 PROCEDURE — 99202 OFFICE O/P NEW SF 15 MIN: CPT | Performed by: PODIATRIST

## 2022-09-09 PROCEDURE — 88312 SPECIAL STAINS GROUP 1: CPT | Performed by: STUDENT IN AN ORGANIZED HEALTH CARE EDUCATION/TRAINING PROGRAM

## 2022-09-09 PROCEDURE — 87107 FUNGI IDENTIFICATION MOLD: CPT | Performed by: PODIATRIST

## 2022-09-09 RX ORDER — TERBINAFINE HYDROCHLORIDE 250 MG/1
TABLET ORAL
Qty: 15 TABLET | Refills: 0 | Status: SHIPPED | OUTPATIENT
Start: 2022-09-09 | End: 2022-10-09

## 2022-09-09 NOTE — PROGRESS NOTES
Assessment/Plan:  Pain upon ambulation  Acquired deformity foot  Hammertoe formation  Interdigital tinea pedis  Mycosis of nail  Plan  Foot exam performed  Patient educated on condition  Today piece of left hallux nail harvested for PAS staining  Culture sensitivity to be done  Patient be placed on empiric course of terbinafine  He will spray shoes with Lysol  Return for follow-up         Diagnoses and all orders for this visit:    Pain in both feet    Onychomycosis  -     terbinafine (LamISIL) 250 mg tablet; 1 tab p o  every other day  Tinea pedis of both feet  -     terbinafine (LamISIL) 250 mg tablet; 1 tab p o  every other day  Subjective:  Patient has pain in his feet  Left greater than right  He has pain around his toes  No history of trauma    No Known Allergies      Current Outpatient Medications:     terbinafine (LamISIL) 250 mg tablet, 1 tab p o  every other day , Disp: 15 tablet, Rfl: 0    lisinopril (ZESTRIL) 10 mg tablet, Take 1 tablet by mouth daily, Disp: , Rfl:     pravastatin (PRAVACHOL) 80 mg tablet, , Disp: , Rfl: 3    traZODone (DESYREL) 50 mg tablet, Take 1 tablet (50 mg total) by mouth daily at bedtime as needed for sleep, Disp: 30 tablet, Rfl: 5    Patient Active Problem List   Diagnosis    Other hyperlipidemia    Essential hypertension    Benign positional vertigo    Chronic congestion of paranasal sinus    Insomnia    Otalgia of left ear    Healthcare maintenance    Colon cancer screening    Persistent insomnia    Screening for cardiovascular, respiratory, and genitourinary diseases    Screening for diabetes mellitus (DM)          Patient ID: Rosalinda Howard is a 61 y o  male  HPI    The following portions of the patient's history were reviewed and updated as appropriate:     family history is not on file  reports that he has never smoked  He has never used smokeless tobacco  He reports current alcohol use   He reports that he does not use drugs  Vitals:    09/09/22 1128   BP: 134/78   Resp: 17       Review of Systems      Objective:  Patient's shoes and socks removed  Foot ExamPhysical Exam  Vitals reviewed  Constitutional:       Appearance: Normal appearance  Cardiovascular:      Rate and Rhythm: Normal rate and regular rhythm  Skin:     Capillary Refill: Capillary refill takes less than 2 seconds  Comments: Patient demonstrates mild hyperhidrosis of foot  Has interdigital maceration  Mild tinea pedis noted  All nails are dystrophic  Hallux nail bilateral demonstrates subungual mycosis   Neurological:      Mental Status: He is alert  Psychiatric:         Mood and Affect: Mood normal          Behavior: Behavior normal          Thought Content:  Thought content normal          Judgment: Judgment normal

## 2022-09-12 ENCOUNTER — LAB REQUISITION (OUTPATIENT)
Dept: LAB | Facility: HOSPITAL | Age: 57
End: 2022-09-12
Payer: COMMERCIAL

## 2022-09-12 DIAGNOSIS — B35.1 TINEA UNGUIUM: ICD-10-CM

## 2022-09-13 LAB — KOH PREP SPEC: NORMAL

## 2022-09-16 LAB — FUNGUS SPEC CULT: NORMAL

## 2022-09-26 LAB — FUNGUS SPEC CULT: ABNORMAL

## 2022-10-11 ENCOUNTER — OFFICE VISIT (OUTPATIENT)
Dept: FAMILY MEDICINE CLINIC | Facility: CLINIC | Age: 57
End: 2022-10-11
Payer: COMMERCIAL

## 2022-10-11 VITALS
SYSTOLIC BLOOD PRESSURE: 118 MMHG | RESPIRATION RATE: 18 BRPM | WEIGHT: 176 LBS | OXYGEN SATURATION: 98 % | HEART RATE: 68 BPM | TEMPERATURE: 97.6 F | DIASTOLIC BLOOD PRESSURE: 78 MMHG | BODY MASS INDEX: 26.67 KG/M2 | HEIGHT: 68 IN

## 2022-10-11 DIAGNOSIS — H92.02 OTALGIA OF LEFT EAR: ICD-10-CM

## 2022-10-11 DIAGNOSIS — M54.2 NECK PAIN ON LEFT SIDE: Primary | ICD-10-CM

## 2022-10-11 DIAGNOSIS — I10 ESSENTIAL HYPERTENSION: ICD-10-CM

## 2022-10-11 DIAGNOSIS — G47.00 PERSISTENT INSOMNIA: ICD-10-CM

## 2022-10-11 PROCEDURE — 99214 OFFICE O/P EST MOD 30 MIN: CPT | Performed by: FAMILY MEDICINE

## 2022-10-11 RX ORDER — METHYLPREDNISOLONE 4 MG/1
TABLET ORAL
Qty: 21 TABLET | Refills: 0 | Status: SHIPPED | OUTPATIENT
Start: 2022-10-11 | End: 2022-10-17

## 2022-10-11 NOTE — PROGRESS NOTES
Assessment/Plan:    No problem-specific Assessment & Plan notes found for this encounter  Ongoing left neck and mastoid area pain with normal ENT eval in past  Check CT neck  May need further imaging  XR if needed for neck CT  May need PT advised, if required    No sign of infection today  No TM irregularity or EAC stenosis  Not tender specifically at mastoid    Steroid risks aware    htn stable     Diagnoses and all orders for this visit:    Neck pain on left side  -     CT soft tissue neck w contrast; Future  -     methylPREDNISolone 4 MG tablet therapy pack; Use as directed on package  -     XR spine cervical 2 or 3 vw injury; Future    Otalgia of left ear  -     methylPREDNISolone 4 MG tablet therapy pack; Use as directed on package    Essential hypertension    Persistent insomnia          Return if symptoms worsen or fail to improve  Subjective:      Patient ID: Ant Dyer is a 61 y o  male  Chief Complaint   Patient presents with   • Earache     Left, Sunday night, ?neck          sas/cma       HPI  Left ear pain  Behind the ear  Happened in past about 1x/y  3d ago  Sharp pain  Throbbing  Not tender to touch  Ear lobe itself is ok  Left hearing feels clogged  Last ENT eval, last year in [facility], all tests normal there    Benadryl good for sleep  Trazodone made him too groggy    No fever    The following portions of the patient's history were reviewed and updated as appropriate: allergies, current medications, past family history, past medical history, past social history, past surgical history and problem list     Review of Systems   Constitutional: Negative for chills and fever           Current Outpatient Medications   Medication Sig Dispense Refill   • lisinopril (ZESTRIL) 10 mg tablet Take 1 tablet by mouth daily     • methylPREDNISolone 4 MG tablet therapy pack Use as directed on package 21 tablet 0   • pravastatin (PRAVACHOL) 80 mg tablet   3     No current facility-administered medications for this visit  Objective:    /78   Pulse 68   Temp 97 6 °F (36 4 °C)   Resp 18   Ht 5' 8" (1 727 m)   Wt 79 8 kg (176 lb)   SpO2 98%   BMI 26 76 kg/m²        Physical Exam  Vitals and nursing note reviewed  Constitutional:       General: He is not in acute distress  Appearance: He is well-developed  He is not ill-appearing  HENT:      Head: Normocephalic  Right Ear: Tympanic membrane, ear canal and external ear normal  There is no impacted cerumen  Left Ear: Tympanic membrane, ear canal and external ear normal  There is no impacted cerumen  Mouth/Throat:      Pharynx: No oropharyngeal exudate  Eyes:      General: No scleral icterus  Conjunctiva/sclera: Conjunctivae normal    Cardiovascular:      Rate and Rhythm: Normal rate and regular rhythm  Heart sounds: No murmur heard  Pulmonary:      Effort: Pulmonary effort is normal  No respiratory distress  Abdominal:      Palpations: Abdomen is soft  Musculoskeletal:         General: No deformity  Cervical back: Neck supple  Tenderness present  No rigidity  Lymphadenopathy:      Cervical: No cervical adenopathy  Skin:     General: Skin is warm and dry  Coloration: Skin is not pale  Neurological:      Mental Status: He is alert  Motor: No weakness  Gait: Gait normal    Psychiatric:         Mood and Affect: Mood normal          Behavior: Behavior normal          Thought Content:  Thought content normal                 Pricilla Hole

## 2022-10-12 ENCOUNTER — TELEPHONE (OUTPATIENT)
Dept: ADMINISTRATIVE | Facility: HOSPITAL | Age: 57
End: 2022-10-12

## 2022-10-12 DIAGNOSIS — I10 ESSENTIAL HYPERTENSION: Primary | ICD-10-CM

## 2022-10-12 PROBLEM — Z13.6 SCREENING FOR CARDIOVASCULAR, RESPIRATORY, AND GENITOURINARY DISEASES: Status: RESOLVED | Noted: 2022-01-10 | Resolved: 2022-10-12

## 2022-10-12 PROBLEM — Z12.11 COLON CANCER SCREENING: Status: RESOLVED | Noted: 2022-01-10 | Resolved: 2022-10-12

## 2022-10-12 PROBLEM — Z13.1 SCREENING FOR DIABETES MELLITUS (DM): Status: RESOLVED | Noted: 2022-01-10 | Resolved: 2022-10-12

## 2022-10-12 PROBLEM — Z13.83 SCREENING FOR CARDIOVASCULAR, RESPIRATORY, AND GENITOURINARY DISEASES: Status: RESOLVED | Noted: 2022-01-10 | Resolved: 2022-10-12

## 2022-10-12 PROBLEM — Z00.00 HEALTHCARE MAINTENANCE: Status: RESOLVED | Noted: 2022-01-10 | Resolved: 2022-10-12

## 2022-10-12 PROBLEM — Z13.89 SCREENING FOR CARDIOVASCULAR, RESPIRATORY, AND GENITOURINARY DISEASES: Status: RESOLVED | Noted: 2022-01-10 | Resolved: 2022-10-12

## 2022-10-12 NOTE — TELEPHONE ENCOUNTER
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

## 2022-10-13 ENCOUNTER — HOSPITAL ENCOUNTER (EMERGENCY)
Facility: HOSPITAL | Age: 57
Discharge: HOME | End: 2022-10-13
Attending: EMERGENCY MEDICINE
Payer: COMMERCIAL

## 2022-10-13 ENCOUNTER — APPOINTMENT (EMERGENCY)
Dept: RADIOLOGY | Facility: HOSPITAL | Age: 57
End: 2022-10-13
Payer: COMMERCIAL

## 2022-10-13 VITALS
HEART RATE: 62 BPM | TEMPERATURE: 97.9 F | RESPIRATION RATE: 18 BRPM | DIASTOLIC BLOOD PRESSURE: 69 MMHG | BODY MASS INDEX: 26.67 KG/M2 | OXYGEN SATURATION: 98 % | SYSTOLIC BLOOD PRESSURE: 118 MMHG | HEIGHT: 68 IN | WEIGHT: 176 LBS

## 2022-10-13 DIAGNOSIS — R51.9 HEADACHE: Primary | ICD-10-CM

## 2022-10-13 DIAGNOSIS — R42 DIZZINESS: ICD-10-CM

## 2022-10-13 LAB
ALBUMIN SERPL BCP-MCNC: 4.1 G/DL (ref 3.5–5)
ALP SERPL-CCNC: 70 U/L (ref 46–116)
ALT SERPL W P-5'-P-CCNC: 35 U/L (ref 12–78)
ANION GAP SERPL CALCULATED.3IONS-SCNC: 9 MMOL/L (ref 4–13)
APTT PPP: 25 SECONDS (ref 23–37)
AST SERPL W P-5'-P-CCNC: 12 U/L (ref 5–45)
BASOPHILS # BLD AUTO: 0.03 THOUSANDS/ΜL (ref 0–0.1)
BASOPHILS NFR BLD AUTO: 0 % (ref 0–1)
BILIRUB SERPL-MCNC: 1.03 MG/DL (ref 0.2–1)
BUN SERPL-MCNC: 12 MG/DL (ref 5–25)
CALCIUM SERPL-MCNC: 9.6 MG/DL (ref 8.3–10.1)
CHLORIDE SERPL-SCNC: 104 MMOL/L (ref 96–108)
CO2 SERPL-SCNC: 28 MMOL/L (ref 21–32)
CREAT SERPL-MCNC: 1.06 MG/DL (ref 0.6–1.3)
EOSINOPHIL # BLD AUTO: 0.01 THOUSAND/ΜL (ref 0–0.61)
EOSINOPHIL NFR BLD AUTO: 0 % (ref 0–6)
ERYTHROCYTE [DISTWIDTH] IN BLOOD BY AUTOMATED COUNT: 13 % (ref 11.6–15.1)
GFR SERPL CREATININE-BSD FRML MDRD: 76 ML/MIN/1.73SQ M
GLUCOSE SERPL-MCNC: 110 MG/DL (ref 65–140)
HCT VFR BLD AUTO: 44.8 % (ref 36.5–49.3)
HGB BLD-MCNC: 15.4 G/DL (ref 12–17)
IMM GRANULOCYTES # BLD AUTO: 0.08 THOUSAND/UL (ref 0–0.2)
IMM GRANULOCYTES NFR BLD AUTO: 1 % (ref 0–2)
INR PPP: 0.99 (ref 0.84–1.19)
LYMPHOCYTES # BLD AUTO: 1.71 THOUSANDS/ΜL (ref 0.6–4.47)
LYMPHOCYTES NFR BLD AUTO: 12 % (ref 14–44)
MCH RBC QN AUTO: 29.9 PG (ref 26.8–34.3)
MCHC RBC AUTO-ENTMCNC: 34.4 G/DL (ref 31.4–37.4)
MCV RBC AUTO: 87 FL (ref 82–98)
MONOCYTES # BLD AUTO: 0.95 THOUSAND/ΜL (ref 0.17–1.22)
MONOCYTES NFR BLD AUTO: 7 % (ref 4–12)
NEUTROPHILS # BLD AUTO: 11.81 THOUSANDS/ΜL (ref 1.85–7.62)
NEUTS SEG NFR BLD AUTO: 80 % (ref 43–75)
NRBC BLD AUTO-RTO: 0 /100 WBCS
PLATELET # BLD AUTO: 296 THOUSANDS/UL (ref 149–390)
PMV BLD AUTO: 9.6 FL (ref 8.9–12.7)
POTASSIUM SERPL-SCNC: 3.6 MMOL/L (ref 3.5–5.3)
PROT SERPL-MCNC: 7.7 G/DL (ref 6.4–8.4)
PROTHROMBIN TIME: 13.2 SECONDS (ref 11.6–14.5)
RBC # BLD AUTO: 5.15 MILLION/UL (ref 3.88–5.62)
SODIUM SERPL-SCNC: 141 MMOL/L (ref 135–147)
WBC # BLD AUTO: 14.59 THOUSAND/UL (ref 4.31–10.16)

## 2022-10-13 PROCEDURE — 99284 EMERGENCY DEPT VISIT MOD MDM: CPT | Performed by: EMERGENCY MEDICINE

## 2022-10-13 PROCEDURE — 70498 CT ANGIOGRAPHY NECK: CPT

## 2022-10-13 PROCEDURE — 85610 PROTHROMBIN TIME: CPT | Performed by: EMERGENCY MEDICINE

## 2022-10-13 PROCEDURE — 93005 ELECTROCARDIOGRAM TRACING: CPT

## 2022-10-13 PROCEDURE — 85025 COMPLETE CBC W/AUTO DIFF WBC: CPT | Performed by: EMERGENCY MEDICINE

## 2022-10-13 PROCEDURE — 85730 THROMBOPLASTIN TIME PARTIAL: CPT | Performed by: EMERGENCY MEDICINE

## 2022-10-13 PROCEDURE — G1004 CDSM NDSC: HCPCS

## 2022-10-13 PROCEDURE — 80053 COMPREHEN METABOLIC PANEL: CPT | Performed by: EMERGENCY MEDICINE

## 2022-10-13 PROCEDURE — 70496 CT ANGIOGRAPHY HEAD: CPT

## 2022-10-13 PROCEDURE — 36415 COLL VENOUS BLD VENIPUNCTURE: CPT | Performed by: EMERGENCY MEDICINE

## 2022-10-13 PROCEDURE — 99284 EMERGENCY DEPT VISIT MOD MDM: CPT

## 2022-10-13 RX ORDER — SUMATRIPTAN 50 MG/1
TABLET, FILM COATED ORAL
Qty: 10 TABLET | Refills: 0 | Status: SHIPPED | OUTPATIENT
Start: 2022-10-13

## 2022-10-13 RX ADMIN — IOHEXOL 85 ML: 300 INJECTION, SOLUTION INTRAVENOUS at 15:34

## 2022-10-13 NOTE — ED NOTES
Patient back from CT scan  Resting comfortably now watching TV       Tre Merida, WILMER  10/13/22 9519

## 2022-10-13 NOTE — DISCHARGE INSTRUCTIONS
Your CTA of the head and neck was normal   These symptoms may be due to migraine  Try imitrex and make an appointment with Dr Natalie Mccoy

## 2022-10-14 LAB
ATRIAL RATE: 67 BPM
P AXIS: 53 DEGREES
PR INTERVAL: 140 MS
QRS AXIS: -12 DEGREES
QRSD INTERVAL: 86 MS
QT INTERVAL: 398 MS
QTC INTERVAL: 420 MS
T WAVE AXIS: 32 DEGREES
VENTRICULAR RATE: 67 BPM

## 2022-10-14 PROCEDURE — 93010 ELECTROCARDIOGRAM REPORT: CPT | Performed by: INTERNAL MEDICINE

## 2022-10-15 NOTE — ED PROVIDER NOTES
History  Chief Complaint   Patient presents with   • Dizziness     Pt started on prednisone yesterday for neck pain and after this mornings dose he felt dizzy with redness in his face     57yoM hx chronic posterior headaches associated with L sided neck pain and ringing in the ears presents with uncontrolled pain  Has never had imaging  No numbness/weakness  No trauma  +fam hx migraine  Prior to Admission Medications   Prescriptions Last Dose Informant Patient Reported? Taking?   lisinopril (ZESTRIL) 10 mg tablet   Yes No   Sig: Take 1 tablet by mouth daily   methylPREDNISolone 4 MG tablet therapy pack   No No   Sig: Use as directed on package   pravastatin (PRAVACHOL) 80 mg tablet   Yes No      Facility-Administered Medications: None       Past Medical History:   Diagnosis Date   • High cholesterol    • Hypertension        Past Surgical History:   Procedure Laterality Date   • GALLBLADDER SURGERY     • LEG SURGERY Right        History reviewed  No pertinent family history  I have reviewed and agree with the history as documented  E-Cigarette/Vaping   • E-Cigarette Use Never User      E-Cigarette/Vaping Substances   • Nicotine No    • THC No    • CBD No    • Flavoring No    • Other No    • Unknown No      Social History     Tobacco Use   • Smoking status: Never Smoker   • Smokeless tobacco: Never Used   Vaping Use   • Vaping Use: Never used   Substance Use Topics   • Alcohol use: Yes     Comment: rare   • Drug use: Never       Review of Systems   Constitutional: Negative for fever  Neurological: Negative for weakness  All other systems reviewed and are negative  Physical Exam  Physical Exam  Vitals reviewed  Constitutional:       Appearance: He is well-developed  HENT:      Head: Normocephalic and atraumatic  Right Ear: External ear normal       Left Ear: External ear normal       Nose: Nose normal  No rhinorrhea        Mouth/Throat:      Mouth: Mucous membranes are moist    Eyes: Conjunctiva/sclera: Conjunctivae normal    Neck:      Vascular: No carotid bruit  Cardiovascular:      Rate and Rhythm: Normal rate and regular rhythm  Pulmonary:      Effort: Pulmonary effort is normal       Breath sounds: Normal breath sounds  No wheezing or rales  Abdominal:      Palpations: Abdomen is soft  Tenderness: There is no abdominal tenderness  Musculoskeletal:      Cervical back: Neck supple  Right lower leg: No edema  Left lower leg: No edema  Skin:     General: Skin is warm and dry  Neurological:      Mental Status: He is alert and oriented to person, place, and time  Cranial Nerves: No cranial nerve deficit  Sensory: No sensory deficit  Motor: No weakness     Psychiatric:         Mood and Affect: Mood normal          Vital Signs  ED Triage Vitals   Temperature Pulse Respirations Blood Pressure SpO2   10/13/22 1130 10/13/22 1130 10/13/22 1130 10/13/22 1130 10/13/22 1130   97 6 °F (36 4 °C) 92 20 152/94 100 %      Temp Source Heart Rate Source Patient Position - Orthostatic VS BP Location FiO2 (%)   10/13/22 1337 10/13/22 1337 10/13/22 1337 10/13/22 1337 --   Oral Monitor Lying Right arm       Pain Score       10/13/22 1130       No Pain           Vitals:    10/13/22 1130 10/13/22 1337 10/13/22 1630   BP: 152/94 147/86 118/69   Pulse: 92 66 62   Patient Position - Orthostatic VS:  Lying          Visual Acuity  Visual Acuity    Flowsheet Row Most Recent Value   L Pupil Size (mm) 3   R Pupil Size (mm) 3          ED Medications  Medications   iohexol (OMNIPAQUE) 300 mg/mL injection 85 mL (85 mL Intravenous Given 10/13/22 1534)       Diagnostic Studies  Results Reviewed     Procedure Component Value Units Date/Time    Comprehensive metabolic panel [[id number]]  (Abnormal) Collected: 10/13/22 1448    Lab Status: Final result Specimen: Blood from Arm, Right Updated: 10/13/22 1515     Sodium 141 mmol/L      Potassium 3 6 mmol/L      Chloride 104 mmol/L      CO2 28 mmol/L      ANION GAP 9 mmol/L      BUN 12 mg/dL      Creatinine 1 06 mg/dL      Glucose 110 mg/dL      Calcium 9 6 mg/dL      AST 12 U/L      ALT 35 U/L      Alkaline Phosphatase 70 U/L      Total Protein 7 7 g/dL      Albumin 4 1 g/dL      Total Bilirubin 1 03 mg/dL      eGFR 76 ml/min/1 73sq m     Narrative:      National Kid[employer] guidelines for Chronic Kidney Disease (CKD):   •  Stage 1 with normal or high GFR (GFR > 90 mL/min/1 73 square meters)  •  Stage 2 Mild CKD (GFR = 60-89 mL/min/1 73 square meters)  •  Stage 3A Moderate CKD (GFR = 45-59 mL/min/1 73 square meters)  •  Stage 3B Moderate CKD (GFR = 30-44 mL/min/1 73 square meters)  •  Stage 4 Severe CKD (GFR = 15-29 mL/min/1 73 square meters)  •  Stage 5 End Stage CKD (GFR <15 mL/min/1 73 square meters)  Note: GFR calculation is accurate only with a steady state creatinine    Protime-INR [[id number]]  (Normal) Collected: 10/13/22 1448    Lab Status: Final result Specimen: Blood from Arm, Right Updated: 10/13/22 1506     Protime 13 2 seconds      INR 0 99    APTT [[id number]]  (Normal) Collected: 10/13/22 1448    Lab Status: Final result Specimen: Blood from Arm, Right Updated: 10/13/22 1506     PTT 25 seconds     CBC and differential [[id number]]  (Abnormal) Collected: 10/13/22 1448    Lab Status: Final result Specimen: Blood from Arm, Right Updated: 10/13/22 1453     WBC 14 59 Thousand/uL      RBC 5 15 Million/uL      Hemoglobin 15 4 g/dL      Hematocrit 44 8 %      MCV 87 fL      MCH 29 9 pg      MCHC 34 4 g/dL      RDW 13 0 %      MPV 9 6 fL      Platelets 963 Thousands/uL      nRBC 0 /100 WBCs      Neutrophils Relative 80 %      Immat GRANS % 1 %      Lymphocytes Relative 12 %      Monocytes Relative 7 %      Eosinophils Relative 0 %      Basophils Relative 0 %      Neutrophils Absolute 11 81 Thousands/µL      Immature Grans Absolute 0 08 Thousand/uL      Lymphocytes Absolute 1 71 Thousands/µL      Monocytes Absolute 0 95 Thousand/µL Eosinophils Absolute 0 01 Thousand/µL      Basophils Absolute 0 03 Thousands/µL                  CTA head and neck with and without contrast   Final Result by Shelby 6, DO (10/13 1607)      Normal CT of the brain  No significant carotid or vertebral artery stenosis  No focal intracranial stenosis or aneurysm  Workstation performed: [id number]                    Procedures  Procedures         ED Course                               SBIRT 20yo+    Flowsheet Row Most Recent Value   SBIRT (25 yo +)    In order to provide better care to our patients, we are screening all of our patients for alcohol and drug use  Would it be okay to ask you these screening questions? No Filed at: 10/13/2022 1316                    [facility]  Number of Diagnoses or Management Options  Dizziness  Headache  Diagnosis management comments: CTA neg for cervical aa  Dissection  Suspect migraine - Rx imitrex, pt to fu with neuro outpt        Disposition  Final diagnoses:   Headache   Dizziness     Time reflects when diagnosis was documented in both MDM as applicable and the Disposition within this note     Time User Action Codes Description Comment    10/13/2022  4:42 PM Abdullahi Mejias [R51 [id number]] Headache     10/13/2022  4:42 PM Sara Krause [R42] Dizziness       ED Disposition     ED Disposition   Discharge    Condition   Stable    Date/Time   Thu Oct 13, 2022 52 [facility] discharge to home/self care  Follow-up Information     Follow up With Specialties Details Why Audie Washington MD Neurology In 1 week  [address]  [phone]            Discharge Medication List as of 10/13/2022  4:44 PM      START taking these medications    Details   SUMAtriptan (Imitrex) 50 mg tablet 1 tab as needed for headache, repeat in 2h if not improved    Max 200mg/24hrs, Normal         CONTINUE these medications which have NOT CHANGED    Details lisinopril (ZESTRIL) 10 mg tablet Take 1 tablet by mouth daily, Historical Med      methylPREDNISolone 4 MG tablet therapy pack Use as directed on package, Normal      pravastatin (PRAVACHOL) 80 mg tablet Starting Tue 4/9/2019, Historical Med             No discharge procedures on file      PDMP Review     None          ED Provider  Electronically Signed by           Naomi Horowitz DO  10/15/22 1163

## 2022-10-25 ENCOUNTER — TELEPHONE (OUTPATIENT)
Dept: FAMILY MEDICINE CLINIC | Facility: CLINIC | Age: 57
End: 2022-10-25

## 2022-10-25 NOTE — TELEPHONE ENCOUNTER
TYRELI  Pt is going to the doctor you recommended, after he will let you know what was decided at the visist   So you can hold off on the PT   ZHAO Donaldson

## 2022-10-25 NOTE — TELEPHONE ENCOUNTER
Please let him know that his insurance company requires he try 6 weeks of PT for them to consider authorizing the CAT scan of his neck (as I had mentioned at the visit)    If he is willing to do this, I can order the PT

## 2023-12-26 ENCOUNTER — OFFICE VISIT (OUTPATIENT)
Dept: FAMILY MEDICINE CLINIC | Facility: CLINIC | Age: 58
End: 2023-12-26
Payer: COMMERCIAL

## 2023-12-26 VITALS
WEIGHT: 170 LBS | TEMPERATURE: 98.8 F | RESPIRATION RATE: 16 BRPM | BODY MASS INDEX: 25.85 KG/M2 | SYSTOLIC BLOOD PRESSURE: 118 MMHG | HEART RATE: 83 BPM | DIASTOLIC BLOOD PRESSURE: 80 MMHG

## 2023-12-26 DIAGNOSIS — J20.9 ACUTE BRONCHITIS, UNSPECIFIED ORGANISM: Primary | ICD-10-CM

## 2023-12-26 DIAGNOSIS — E78.49 OTHER HYPERLIPIDEMIA: ICD-10-CM

## 2023-12-26 DIAGNOSIS — I10 ESSENTIAL HYPERTENSION: ICD-10-CM

## 2023-12-26 PROCEDURE — 99214 OFFICE O/P EST MOD 30 MIN: CPT | Performed by: NURSE PRACTITIONER

## 2023-12-26 RX ORDER — AZITHROMYCIN 250 MG/1
TABLET, FILM COATED ORAL
Qty: 6 TABLET | Refills: 0 | Status: SHIPPED | OUTPATIENT
Start: 2023-12-26 | End: 2023-12-31

## 2023-12-26 RX ORDER — PREDNISONE 10 MG/1
TABLET ORAL
Qty: 20 TABLET | Refills: 0 | Status: SHIPPED | OUTPATIENT
Start: 2023-12-26 | End: 2024-01-05

## 2023-12-26 NOTE — PATIENT INSTRUCTIONS
Azithromycin (By mouth)   Azithromycin (fv-xvaw-eyj-MYE-sin)  Treats infections. This medicine is a macrolide antibiotic.   Brand Name(s): Zithromax, Zithromax Tri-Matt, Zithromax Z-Matt, Zmax   There may be other brand names for this medicine.  When This Medicine Should Not Be Used:   This medicine is not right for everyone. Do not use it if you had an allergic reaction to azithromycin, erythromycin, or similar medicines, or if you have a history of liver problems caused by azithromycin.  How to Use This Medicine:   Capsule, Liquid, Packet, Powder, Tablet  Your doctor will tell you how much medicine to use. Do not use more than directed.  Take all of the medicine in your prescription to clear up your infection, even if you feel better after the first few doses.  Multiple dose (Zithromax® oral liquid or tablets):   You may take this medicine with or without food.  Shake the bottle well before you measure the medicine. Measure the oral liquid medicine with a marked measuring spoon, oral syringe, or medicine cup.  Single dose (Zmax® extended-release oral liquid or powder):   Liquid:   Take this medicine on an empty stomach at least 1 hour before you eat, or 2 hours after you eat.  Call your doctor right away if you vomit within 1 hour after you take the medicine.  You must take the liquid within 12 hours after the pharmacist gives it to you.  Shake the bottle well before you measure the medicine. Measure your dose with a marked measuring spoon, cup, or syringe.  Powder:   Open 1 packet and pour all of the medicine into a glass with about 2 ounces (¼ cup) of water. Mix well and drink the medicine right away. Pour another 2 ounces of water into the same glass, and drink the remaining medicine.  Read and follow the patient instructions that come with this medicine. Talk to your doctor or pharmacist if you have any questions.  Missed dose: If you are taking multiple doses, take the dose as soon as you remember. If it is  almost time for your next dose, wait until then to take a regular dose. Do not use extra medicine to make up for a missed dose.  Store the medicine in a closed container at room temperature, away from heat, moisture, and direct light.  Extended-release oral liquid: Do not refrigerate or freeze.  Oral liquid for 1 dose only: Store at room temperature. Do not store in the refrigerator or allow the medicine to freeze.  Oral liquid for multiple doses: Store at room temperature or in the refrigerator. Use it within 10 days of filling the prescription.  Drugs and Foods to Avoid:   Ask your doctor or pharmacist before using any other medicine, including over-the-counter medicines, vitamins, and herbal products.  Some medicines can affect how azithromycin works. Tell your doctor if you are also using any of the following:  Colchicine, cyclosporine, digoxin, nelfinavir, phenytoin  Blood thinner (including warfarin)  Ergot medicine  Medicine for a heart rhythm problem (including amiodarone, dofetilide, procainamide, quinidine, sotalol)  Zithromax® for multiple doses: Do not take an antacid that contains magnesium or aluminum at the same time you take Zithromax®. An antacid will affect how the medicine works. Antacids will not affect Zmax® for single dose.  Warnings While Using This Medicine:   Tell your doctor if you are pregnant or breastfeeding, or if you have kidney disease, liver disease, heart disease, heart rhythm problems, heart failure, or myasthenia gravis. Tell your doctor if anyone in your family has heart rhythm problems.  This medicine may cause the following problems:   Serious skin reactions, including Wetzel-Rodrigue syndrome, acute generalized exanthematous pustulosis, toxic epidermal necrolysis, and drug reaction with eosinophilia and systemic symptoms (DRESS)  Liver problems  Infantile hypertrophic pyloric stenosis  Heart rhythm problems, including QT prolongation  Increased risk of serious heart or blood  vessel problems  This medicine can cause diarrhea. Call your doctor if the diarrhea becomes severe, does not stop, or is bloody. Do not take any medicine to stop diarrhea until you have talked to your doctor. Diarrhea can occur 2 months or more after you stop taking this medicine. It may occur 2 months or more after you stop using this medicine.  Call your doctor if your symptoms do not improve or if they get worse.  Your doctor will do lab tests at regular visits to check on the effects of this medicine. Keep all appointments.  Keep all medicine out of the reach of children. Never share your medicine with anyone.  Possible Side Effects While Using This Medicine:   Call your doctor right away if you notice any of these side effects:  Allergic reaction: Itching or hives, swelling in your face or hands, swelling or tingling in your mouth or throat, chest tightness, trouble breathing  Blistering, peeling, red skin rash  Dark urine, pale stools, nausea, vomiting, loss of appetite, stomach pain, yellow skin or eyes  Fainting, dizziness, lightheadedness  Fast, pounding, or uneven heartbeat, blurred vision, chest pain, trouble breathing, tiredness or weakness  Feeling irritable or vomits after feeding (in babies)  Severe diarrhea that may contain blood, stomach cramps, fever  If you notice these less serious side effects, talk with your doctor:   Mild diarrhea, nausea, vomiting, stomach pain  If you notice other side effects that you think are caused by this medicine, tell your doctor.   Call your doctor for medical advice about side effects. You may report side effects to FDA at 2-565-FDA-9321  © Copyright [employer] 2023 Information is for End User's use only and may not be sold, redistributed or otherwise used for commercial purposes.  The above information is an  only. It is not intended as medical advice for individual conditions or treatments. Talk to your doctor, nurse or pharmacist before following any  medical regimen to see if it is safe and effective for you.  Prednisone (By mouth)   Prednisone (PRED-ni-sone)  Treats many diseases and conditions, especially problems related to inflammation. This medicine is a corticosteroid.   Brand Name(s): Kendra, predniSONE Intensol   There may be other brand names for this medicine.  When This Medicine Should Not Be Used:   This medicine is not right for everyone. Do not use if you had an allergic reaction to prednisone or if you are pregnant.  How to Use This Medicine:   Liquid, Tablet, Delayed Release Tablet  Take your medicine as directed. Your dose may need to be changed several times to find what works best for you.  It is best to take this medicine with food or milk.  Swallow the delayed-release tablet whole. Do not crush, break, or chew it.  Measure the oral liquid medicine with a marked measuring spoon, oral syringe, or medicine cup.  Missed dose: Take a dose as soon as you remember. If it is almost time for your next dose, wait until then and take a regular dose. Do not take extra medicine to make up for a missed dose.  Store the medicine in a closed container at room temperature, away from heat, moisture, and direct light. Do not freeze the oral liquid.  Drugs and Foods to Avoid:   Ask your doctor or pharmacist before using any other medicine, including over-the-counter medicines, vitamins, and herbal products.  Tell your doctor if you use any of the following:  Aminoglutethimide, amphotericin B, carbamazepine, cholestyramine, cyclosporine, digoxin, isoniazid, ketoconazole, phenobarbital, phenytoin, or rifampin  Blood thinner, such as warfarin  NSAID pain or arthritis medicine, such as aspirin, diclofenac, ibuprofen, naproxen, celecoxib  Diuretic (water pill)  Diabetes medicine  Macrolide antibiotic, such as azithromycin, clarithromycin, erythromycin  Estrogen, including birth control pills or hormone replacement therapy  This medicine may interfere with vaccines.  Ask your doctor before you get a flu shot or any other vaccines.  Warnings While Using This Medicine:   It is not safe to take this medicine during pregnancy. It could harm an unborn baby. Tell your doctor right away if you become pregnant.  Tell your doctor if you are breastfeeding or if you have kidney problems, heart failure, high blood pressure, a recent heart attack, diabetes, glaucoma, osteoporosis, or thyroid problems. Tell your doctor about any infection you have. Also tell your doctor if you have had mental or emotional problems (such as depression) or stomach or bowel problems (such as an ulcer or diverticulitis).  This medicine may cause the following problems:  Mood or behavior changes  Higher blood pressure, retaining water, changes in salt or potassium levels in your body  Cataracts or glaucoma (with long-term use)  Weak bones or osteoporosis (with long-term use)  Slow growth in children (with long-term use)  Muscle problems (with high doses, especially if you have myasthenia gravis or similar nerve and muscle problems)  Do not stop using this medicine suddenly. Your doctor will need to slowly decrease your dose before you stop it completely.  This medicine could cause you to get infections more easily. Tell your doctor right away if you are exposed to chicken pox, measles, or other serious infection. Tell your doctor if you had a serious infection in the past, such as tuberculosis or herpes.  Tell your doctor about any extra stress or anxiety in your life. Your dose might need to be changed for a short time.  Tell any doctor or dentist who treats you that you are using this medicine. This medicine may affect certain medical test results.  Keep all medicine out of the reach of children. Never share your medicine with anyone.  Possible Side Effects While Using This Medicine:   Call your doctor right away if you notice any of these side effects:  Allergic reaction: Itching or hives, swelling in your face  or hands, swelling or tingling in your mouth or throat, chest tightness, trouble breathing  Dark freckles, skin color changes, coldness, weakness, tiredness, nausea, vomiting, weight loss  Depression, unusual thoughts, feelings, or behaviors, trouble sleeping  Fever, chills, cough, sore throat, and body aches  Muscle pain or weakness  Rapid weight gain, swelling in your hands, ankles, or feet  Severe stomach pain, nausea, vomiting, or red or black stools  Skin changes or growths  Trouble seeing, eye pain, headache  If you notice these less serious side effects, talk with your doctor:   Increased appetite  Round, puffy face  Weight gain around your neck, upper back, breast, face, or waist  If you notice other side effects that you think are caused by this medicine, tell your doctor.   Call your doctor for medical advice about side effects. You may report side effects to FDA at 5-933-FDA-1250  © Copyright [employer] 2023 Information is for End User's use only and may not be sold, redistributed or otherwise used for commercial purposes.  The above information is an  only. It is not intended as medical advice for individual conditions or treatments. Talk to your doctor, nurse or pharmacist before following any medical regimen to see if it is safe and effective for you.

## 2023-12-26 NOTE — PROGRESS NOTES
Assessment/Plan:    1. Acute bronchitis, unspecified organism  -     azithromycin (ZITHROMAX) 250 mg tablet; Take 500mg on day 1, 250mg on days 2-5  -     predniSONE 10 mg tablet; 4 tabs x 2 days, 3 tabs x 2 days, 2 tabs x 2 days, 1 tab x 2 days    2. Essential hypertension  Assessment & Plan:  Stable with current regimen      3. Other hyperlipidemia  Assessment & Plan:  Complaint with statin and tolerating it well and managed by cardiologist          Patient Instructions:  Take medication with food.  It is important that you take the entire course of antibiotics prescribed.  May also take a probiotic of your choice to maintain healthy GI michelle.    Can take some probiotic and yogurt with the medication.  Take prednisone with food in morning and do not take any NSAID's while taking prednisone.    Return if symptoms worsen or fail to improve.      No future appointments.        Subjective:      Patient ID: Shaheen Nagy is a 58 y.o. male.    Chief Complaint   Patient presents with   • Cough     Ear pressure, SX 1.5 weeks              sas/cma         Vitals:  /80   Pulse 83   Temp 98.8 °F (37.1 °C)   Resp 16   Wt 77.1 kg (170 lb)   BMI 25.85 kg/m²     Patient stated that started with cough and ear pressure from week and half ago. Denies fever, chills and sob. Taking OTC but no relief.  Complaint with medications for chronic illnesses and tolerating it well  Patient is under care of cardiologist    Cough  Associated symptoms include ear pain. Pertinent negatives include no chills, fever, headaches, postnasal drip, rhinorrhea, sore throat, shortness of breath or wheezing.               PHQ-2/9 Depression Screening    Little interest or pleasure in doing things: 0 - not at all  Feeling down, depressed, or hopeless: 0 - not at all  PHQ-2 Score: 0  PHQ-2 Interpretation: Negative depression screen             The following portions of the patient's history were reviewed and updated as appropriate: allergies,  current medications, past family history, past medical history, past social history, past surgical history and problem list.      Review of Systems   Constitutional:  Negative for chills, diaphoresis, fatigue, fever and unexpected weight change.   HENT:  Positive for ear pain. Negative for congestion, dental problem, drooling, ear discharge, facial swelling, hearing loss, mouth sores, nosebleeds, postnasal drip, rhinorrhea, sinus pressure, sinus pain, sneezing, sore throat, tinnitus, trouble swallowing and voice change.    Respiratory:  Positive for cough. Negative for chest tightness, shortness of breath and wheezing.    Cardiovascular: Negative.    Gastrointestinal:  Negative for abdominal pain, constipation, diarrhea, nausea and vomiting.   Musculoskeletal: Negative.    Skin: Negative.    Neurological:  Negative for dizziness, light-headedness and headaches.         Objective:    Social History     Tobacco Use   Smoking Status Never   Smokeless Tobacco Never       Allergies: No Known Allergies      Current Outpatient Medications   Medication Sig Dispense Refill   • azithromycin (ZITHROMAX) 250 mg tablet Take 500mg on day 1, 250mg on days 2-5 6 tablet 0   • lisinopril (ZESTRIL) 10 mg tablet Take 1 tablet by mouth daily     • pravastatin (PRAVACHOL) 80 mg tablet   3   • predniSONE 10 mg tablet 4 tabs x 2 days, 3 tabs x 2 days, 2 tabs x 2 days, 1 tab x 2 days 20 tablet 0   • SUMAtriptan (Imitrex) 50 mg tablet 1 tab as needed for headache, repeat in 2h if not improved.  Max 200mg/24hrs (Patient not taking: Reported on 12/26/2023) 10 tablet 0     No current facility-administered medications for this visit.          Physical Exam  Vitals reviewed.   Constitutional:       Appearance: Normal appearance. He is well-developed.   HENT:      Head: Normocephalic.      Right Ear: Tympanic membrane, ear canal and external ear normal.      Left Ear: Tympanic membrane, ear canal and external ear normal.      Nose: Nose normal.       Right Sinus: No maxillary sinus tenderness or frontal sinus tenderness.      Left Sinus: No maxillary sinus tenderness or frontal sinus tenderness.      Mouth/Throat:      Mouth: No oral lesions.      Pharynx: No oropharyngeal exudate or posterior oropharyngeal erythema.   Cardiovascular:      Rate and Rhythm: Normal rate and regular rhythm.      Heart sounds: Normal heart sounds.   Pulmonary:      Effort: Pulmonary effort is normal.      Breath sounds: Wheezing present.   Musculoskeletal:         General: Normal range of motion.      Cervical back: Neck supple.   Lymphadenopathy:      Cervical:      Right cervical: No superficial or posterior cervical adenopathy.     Left cervical: No superficial or posterior cervical adenopathy.   Skin:     General: Skin is warm and dry.   Neurological:      Mental Status: He is alert and oriented to person, place, and time.   Psychiatric:         Behavior: Behavior normal.         Thought Content: Thought content normal.         Judgment: Judgment normal.                     NEPTALI Garcia

## 2024-03-07 ENCOUNTER — OFFICE VISIT (OUTPATIENT)
Dept: URGENT CARE | Facility: CLINIC | Age: 59
End: 2024-03-07
Payer: COMMERCIAL

## 2024-03-07 VITALS
BODY MASS INDEX: 26.46 KG/M2 | WEIGHT: 174 LBS | SYSTOLIC BLOOD PRESSURE: 110 MMHG | HEART RATE: 69 BPM | TEMPERATURE: 96.9 F | OXYGEN SATURATION: 100 % | DIASTOLIC BLOOD PRESSURE: 80 MMHG | RESPIRATION RATE: 16 BRPM

## 2024-03-07 DIAGNOSIS — H92.02 OTALGIA OF LEFT EAR: Primary | ICD-10-CM

## 2024-03-07 PROCEDURE — 99203 OFFICE O/P NEW LOW 30 MIN: CPT | Performed by: PHYSICIAN ASSISTANT

## 2024-03-07 RX ORDER — CIPROFLOXACIN AND DEXAMETHASONE 3; 1 MG/ML; MG/ML
4 SUSPENSION/ DROPS AURICULAR (OTIC) 2 TIMES DAILY
Qty: 7.5 ML | Refills: 0 | Status: SHIPPED | OUTPATIENT
Start: 2024-03-07 | End: 2024-03-14

## 2024-03-07 NOTE — PROGRESS NOTES
[facility]        NAME: Shaheen Nagy is a 59 y.o. male  : 1965    MRN: [id number]  DATE: 2024  TIME: 1:18 PM    Assessment and Plan   Otalgia of left ear [H92.02]  1. Otalgia of left ear  ciprofloxacin-dexamethasone (CIPRODEX) otic suspension        Encouraged ENT fu if no improvement. Discussed strict return to care precautions as well as red flag symptoms which should prompt immediate ED referral. Pt verbalized understanding and is in agreement with plan.  Please follow up with your primary care provider within the next week. Please remember that your visit today was with an urgent care provider and should not replace follow up with your primary care provider for chronic medical issues or annual physicals.       Patient Instructions       Follow up with PCP in 3-5 days.  Proceed to  ER if symptoms worsen.    If tests are performed, our office will contact you with results only if changes need to made to the care plan discussed with you at the visit. [facility]    Chief Complaint     Chief Complaint   Patient presents with    Earache     Pt presents with left ear pain that radiates down left side jaw// started on Monday         History of Present Illness       Pt is a 60 yo male with pmh HTN pw L ear pain x 3 days. States shortly after it started hurting he noticed a small amount of purulent drainage and it has been very sore ever since. Feels like a toothache but his teeth don't hurt. Saw dentist 1 month ago. Had this same thing happen in  and saw ENT, had CT scan which was normal, and was following up with pcp.        Review of Systems   Review of Systems   Constitutional:  Negative for chills, diaphoresis, fatigue and fever.   HENT:  Positive for ear pain. Negative for congestion, postnasal drip, rhinorrhea, sinus pain, sneezing, sore throat and trouble swallowing.    Eyes:  Negative for pain and redness.   Respiratory:  Negative  for cough, chest tightness, shortness of breath and wheezing.    Cardiovascular:  Negative for chest pain and leg swelling.   Gastrointestinal:  Negative for diarrhea, nausea and vomiting.   Musculoskeletal:  Negative for myalgias.   Neurological:  Negative for dizziness, weakness and headaches.         Current Medications       Current Outpatient Medications:     ciprofloxacin-dexamethasone (CIPRODEX) otic suspension, Administer 4 drops into the left ear 2 (two) times a day for 7 days, Disp: 7.5 mL, Rfl: 0    lisinopril (ZESTRIL) 10 mg tablet, Take 1 tablet by mouth daily, Disp: , Rfl:     pravastatin (PRAVACHOL) 80 mg tablet, , Disp: , Rfl: 3    SUMAtriptan (Imitrex) 50 mg tablet, 1 tab as needed for headache, repeat in 2h if not improved.  Max 200mg/24hrs (Patient not taking: Reported on 12/26/2023), Disp: 10 tablet, Rfl: 0    Current Allergies     Allergies as of 03/07/2024    (No Known Allergies)            The following portions of the patient's history were reviewed and updated as appropriate: allergies, current medications, past family history, past medical history, past social history, past surgical history and problem list.     Past Medical History:   Diagnosis Date    High cholesterol     Hypertension        Past Surgical History:   Procedure Laterality Date    GALLBLADDER SURGERY      LEG SURGERY Right        History reviewed. No pertinent family history.      Medications have been verified.        Objective   /80   Pulse 69   Temp (!) 96.9 °F (36.1 °C)   Resp 16   Wt 78.9 kg (174 lb)   SpO2 100%   BMI 26.46 kg/m²        Physical Exam     Physical Exam  Vitals and nursing note reviewed.   Constitutional:       General: He is not in acute distress.     Appearance: Normal appearance. He is not toxic-appearing.   HENT:      Head: Normocephalic and atraumatic.      Right Ear: Tympanic membrane, ear canal and external ear normal.      Left Ear: Tympanic membrane, ear canal and external ear normal.  Tenderness (tenderness of inferior canal when tip of the otoscope touches it) present. No drainage.  No middle ear effusion. No mastoid tenderness. Tympanic membrane is not erythematous or bulging.      Nose: No congestion.      Mouth/Throat:      Mouth: Mucous membranes are moist.      Pharynx: Oropharynx is clear. No oropharyngeal exudate or posterior oropharyngeal erythema.   Eyes:      Conjunctiva/sclera: Conjunctivae normal.      Pupils: Pupils are equal, round, and reactive to light.   Cardiovascular:      Rate and Rhythm: Normal rate and regular rhythm.      Heart sounds: Normal heart sounds.   Pulmonary:      Effort: Pulmonary effort is normal. No respiratory distress.      Breath sounds: Normal breath sounds. No wheezing, rhonchi or rales.   Musculoskeletal:      Cervical back: Normal range of motion and neck supple.   Lymphadenopathy:      Head:      Left side of head: No preauricular or posterior auricular (tenderness over posterior auricular LN but no appreciable lymphadenopathy) adenopathy.   Skin:     General: Skin is warm and dry.      Capillary Refill: Capillary refill takes less than 2 seconds.   Neurological:      Mental Status: He is alert and oriented to person, place, and time.   Psychiatric:         Behavior: Behavior normal.

## 2024-07-13 ENCOUNTER — HOSPITAL ENCOUNTER (EMERGENCY)
Facility: HOSPITAL | Age: 59
Discharge: HOME | End: 2024-07-13
Attending: EMERGENCY MEDICINE | Admitting: EMERGENCY MEDICINE
Payer: COMMERCIAL

## 2024-07-13 VITALS
OXYGEN SATURATION: 100 % | SYSTOLIC BLOOD PRESSURE: 135 MMHG | RESPIRATION RATE: 16 BRPM | DIASTOLIC BLOOD PRESSURE: 77 MMHG | TEMPERATURE: 97.4 F | HEART RATE: 55 BPM

## 2024-07-13 DIAGNOSIS — R42 VERTIGO: Primary | ICD-10-CM

## 2024-07-13 LAB
2HR DELTA HS TROPONIN: 0 NG/L
ALBUMIN SERPL BCG-MCNC: 4.1 G/DL (ref 3.5–5)
ALP SERPL-CCNC: 55 U/L (ref 34–104)
ALT SERPL W P-5'-P-CCNC: 14 U/L (ref 7–52)
ANION GAP SERPL CALCULATED.3IONS-SCNC: 12 MMOL/L (ref 4–13)
AST SERPL W P-5'-P-CCNC: 14 U/L (ref 13–39)
BASOPHILS # BLD AUTO: 0.1 THOUSANDS/ÂΜL (ref 0–0.1)
BASOPHILS NFR BLD AUTO: 1 % (ref 0–1)
BILIRUB SERPL-MCNC: 0.96 MG/DL (ref 0.2–1)
BUN SERPL-MCNC: 15 MG/DL (ref 5–25)
CALCIUM SERPL-MCNC: 9.6 MG/DL (ref 8.4–10.2)
CARDIAC TROPONIN I PNL SERPL HS: 4 NG/L
CARDIAC TROPONIN I PNL SERPL HS: 4 NG/L
CHLORIDE SERPL-SCNC: 103 MMOL/L (ref 96–108)
CO2 SERPL-SCNC: 22 MMOL/L (ref 21–32)
CREAT SERPL-MCNC: 1.07 MG/DL (ref 0.6–1.3)
EOSINOPHIL # BLD AUTO: 0.67 THOUSAND/ÂΜL (ref 0–0.61)
EOSINOPHIL NFR BLD AUTO: 7 % (ref 0–6)
ERYTHROCYTE [DISTWIDTH] IN BLOOD BY AUTOMATED COUNT: 12.9 % (ref 11.6–15.1)
GFR SERPL CREATININE-BSD FRML MDRD: 75 ML/MIN/1.73SQ M
GLUCOSE SERPL-MCNC: 162 MG/DL (ref 65–140)
HCT VFR BLD AUTO: 44.9 % (ref 36.5–49.3)
HGB BLD-MCNC: 15.7 G/DL (ref 12–17)
IMM GRANULOCYTES # BLD AUTO: 0.07 THOUSAND/UL (ref 0–0.2)
IMM GRANULOCYTES NFR BLD AUTO: 1 % (ref 0–2)
LYMPHOCYTES # BLD AUTO: 3.54 THOUSANDS/ÂΜL (ref 0.6–4.47)
LYMPHOCYTES NFR BLD AUTO: 34 % (ref 14–44)
MCH RBC QN AUTO: 30.5 PG (ref 26.8–34.3)
MCHC RBC AUTO-ENTMCNC: 35 G/DL (ref 31.4–37.4)
MCV RBC AUTO: 87 FL (ref 82–98)
MONOCYTES # BLD AUTO: 1 THOUSAND/ÂΜL (ref 0.17–1.22)
MONOCYTES NFR BLD AUTO: 10 % (ref 4–12)
NEUTROPHILS # BLD AUTO: 4.91 THOUSANDS/ÂΜL (ref 1.85–7.62)
NEUTS SEG NFR BLD AUTO: 47 % (ref 43–75)
NRBC BLD AUTO-RTO: 0 /100 WBCS
PLATELET # BLD AUTO: 297 THOUSANDS/UL (ref 149–390)
PMV BLD AUTO: 9.3 FL (ref 8.9–12.7)
POTASSIUM SERPL-SCNC: 3.6 MMOL/L (ref 3.5–5.3)
PROT SERPL-MCNC: 6.5 G/DL (ref 6.4–8.4)
RBC # BLD AUTO: 5.14 MILLION/UL (ref 3.88–5.62)
SODIUM SERPL-SCNC: 137 MMOL/L (ref 135–147)
WBC # BLD AUTO: 10.29 THOUSAND/UL (ref 4.31–10.16)

## 2024-07-13 PROCEDURE — 87186 SC STD MICRODIL/AGAR DIL: CPT | Performed by: EMERGENCY MEDICINE

## 2024-07-13 PROCEDURE — 96376 TX/PRO/DX INJ SAME DRUG ADON: CPT

## 2024-07-13 PROCEDURE — 80053 COMPREHEN METABOLIC PANEL: CPT | Performed by: EMERGENCY MEDICINE

## 2024-07-13 PROCEDURE — 84484 ASSAY OF TROPONIN QUANT: CPT | Performed by: EMERGENCY MEDICINE

## 2024-07-13 PROCEDURE — 85025 COMPLETE CBC W/AUTO DIFF WBC: CPT | Performed by: EMERGENCY MEDICINE

## 2024-07-13 PROCEDURE — 99285 EMERGENCY DEPT VISIT HI MDM: CPT | Performed by: EMERGENCY MEDICINE

## 2024-07-13 PROCEDURE — 36415 COLL VENOUS BLD VENIPUNCTURE: CPT

## 2024-07-13 PROCEDURE — 87205 SMEAR GRAM STAIN: CPT | Performed by: EMERGENCY MEDICINE

## 2024-07-13 PROCEDURE — 87070 CULTURE OTHR SPECIMN AEROBIC: CPT | Performed by: EMERGENCY MEDICINE

## 2024-07-13 PROCEDURE — 96374 THER/PROPH/DIAG INJ IV PUSH: CPT

## 2024-07-13 PROCEDURE — 10061 I&D ABSCESS COMP/MULTIPLE: CPT | Performed by: EMERGENCY MEDICINE

## 2024-07-13 PROCEDURE — 93005 ELECTROCARDIOGRAM TRACING: CPT

## 2024-07-13 PROCEDURE — 87147 CULTURE TYPE IMMUNOLOGIC: CPT | Performed by: EMERGENCY MEDICINE

## 2024-07-13 PROCEDURE — 96361 HYDRATE IV INFUSION ADD-ON: CPT

## 2024-07-13 PROCEDURE — 99284 EMERGENCY DEPT VISIT MOD MDM: CPT

## 2024-07-13 RX ORDER — HYDROXYZINE HYDROCHLORIDE 25 MG/1
25 TABLET, FILM COATED ORAL EVERY 6 HOURS
Qty: 12 TABLET | Refills: 0 | Status: SHIPPED | OUTPATIENT
Start: 2024-07-13

## 2024-07-13 RX ORDER — DIAZEPAM 5 MG/ML
2.5 INJECTION, SOLUTION INTRAMUSCULAR; INTRAVENOUS ONCE
Status: COMPLETED | OUTPATIENT
Start: 2024-07-13 | End: 2024-07-13

## 2024-07-13 RX ORDER — SULFAMETHOXAZOLE AND TRIMETHOPRIM 800; 160 MG/1; MG/1
1 TABLET ORAL 2 TIMES DAILY
Qty: 14 TABLET | Refills: 0 | Status: SHIPPED | OUTPATIENT
Start: 2024-07-13 | End: 2024-07-20

## 2024-07-13 RX ORDER — MECLIZINE HYDROCHLORIDE 25 MG/1
25 TABLET ORAL ONCE
Status: COMPLETED | OUTPATIENT
Start: 2024-07-13 | End: 2024-07-13

## 2024-07-13 RX ORDER — SULFAMETHOXAZOLE AND TRIMETHOPRIM 800; 160 MG/1; MG/1
1 TABLET ORAL ONCE
Status: COMPLETED | OUTPATIENT
Start: 2024-07-13 | End: 2024-07-13

## 2024-07-13 RX ORDER — DIAZEPAM 5 MG/ML
5 INJECTION, SOLUTION INTRAMUSCULAR; INTRAVENOUS ONCE
Status: COMPLETED | OUTPATIENT
Start: 2024-07-13 | End: 2024-07-13

## 2024-07-13 RX ORDER — DIAZEPAM 5 MG/1
5 TABLET ORAL 2 TIMES DAILY
Qty: 10 TABLET | Refills: 0 | Status: SHIPPED | OUTPATIENT
Start: 2024-07-13 | End: 2024-07-18

## 2024-07-13 RX ADMIN — DIAZEPAM 5 MG: 5 INJECTION INTRAMUSCULAR; INTRAVENOUS at 07:19

## 2024-07-13 RX ADMIN — MECLIZINE HYDROCHLORIDE 25 MG: 25 TABLET ORAL at 07:18

## 2024-07-13 RX ADMIN — SODIUM CHLORIDE 1000 ML: 0.9 INJECTION, SOLUTION INTRAVENOUS at 06:40

## 2024-07-13 RX ADMIN — SULFAMETHOXAZOLE AND TRIMETHOPRIM 1 TABLET: 800; 160 TABLET ORAL at 10:22

## 2024-07-13 RX ADMIN — DIAZEPAM 2.5 MG: 5 INJECTION INTRAMUSCULAR; INTRAVENOUS at 09:06

## 2024-07-13 NOTE — ED PROVIDER NOTES
"History  Chief Complaint   Patient presents with    Vertigo - Recurrent     Pt woke up at 5 am  with vertigo and numbness  bilaterally  in both legs pt is diaphoretic at this time with tachypnea  Pt unable to stay seated or lay in a flat position      Patient has a history of recurrent vertigo episodes.  He was in normal health recently.  He felt well when he awoke from sleep this morning however when he tried to get out of bed the room was suddenly spinning with disequilibrium and nausea without vomiting.  Patient tried to walk to the bathroom and was \"banging into the walls \".  Patient denies any headache chest pain or other pain.  No visual changes.  No speech difficulty.  Patient states it feels like prior episodes but more severe        Prior to Admission Medications   Prescriptions Last Dose Informant Patient Reported? Taking?   SUMAtriptan (Imitrex) 50 mg tablet   No No   Si tab as needed for headache, repeat in 2h if not improved.  Max 200mg/24hrs   Patient not taking: Reported on 2023   ciprofloxacin-dexamethasone (CIPRODEX) otic suspension   No No   Sig: Administer 4 drops into the left ear 2 (two) times a day for 7 days   lisinopril (ZESTRIL) 10 mg tablet   Yes No   Sig: Take 1 tablet by mouth daily   pravastatin (PRAVACHOL) 80 mg tablet   Yes No      Facility-Administered Medications: None       Past Medical History:   Diagnosis Date    High cholesterol     Hypertension        Past Surgical History:   Procedure Laterality Date    GALLBLADDER SURGERY      LEG SURGERY Right        History reviewed. No pertinent family history.  I have reviewed and agree with the history as documented.    E-Cigarette/Vaping    E-Cigarette Use Never User      E-Cigarette/Vaping Substances    Nicotine No     THC No     CBD No     Flavoring No     Other No     Unknown No      Social History     Tobacco Use    Smoking status: Never     Passive exposure: Past    Smokeless tobacco: Never   Vaping Use    Vaping status: " Never Used   Substance Use Topics    Alcohol use: Yes     Comment: rare    Drug use: Never       Review of Systems   Constitutional:  Negative for chills and fever.   HENT:  Negative for congestion and sore throat.    Eyes:  Negative for photophobia and visual disturbance.   Respiratory:  Negative for cough and shortness of breath.    Cardiovascular:  Negative for chest pain.   Gastrointestinal:  Positive for nausea. Negative for abdominal pain and vomiting.   Genitourinary:  Negative for difficulty urinating and dysuria.   Musculoskeletal:  Negative for back pain.   Skin:  Negative for color change and rash.   Neurological:  Positive for dizziness and numbness. Negative for tremors, seizures, syncope, facial asymmetry, speech difficulty, weakness and headaches.   Hematological:  Does not bruise/bleed easily.   Psychiatric/Behavioral:  Negative for confusion.    All other systems reviewed and are negative.      Physical Exam  Physical Exam  Vitals and nursing note reviewed.   Constitutional:       Appearance: Normal appearance.   HENT:      Head: Normocephalic and atraumatic.      Right Ear: Tympanic membrane and external ear normal.      Left Ear: Tympanic membrane and external ear normal.      Nose: Nose normal.      Mouth/Throat:      Mouth: Mucous membranes are moist.   Eyes:      Conjunctiva/sclera: Conjunctivae normal.      Pupils: Pupils are equal, round, and reactive to light.      Comments: Nonsustained lateral nystagmus is present   Cardiovascular:      Rate and Rhythm: Normal rate and regular rhythm.      Pulses: Normal pulses.      Heart sounds: Normal heart sounds.   Pulmonary:      Effort: Pulmonary effort is normal.      Breath sounds: Normal breath sounds.   Abdominal:      Palpations: Abdomen is soft.      Tenderness: There is no abdominal tenderness.   Musculoskeletal:         General: Normal range of motion.      Cervical back: Normal range of motion and neck supple.      Right lower leg: No  edema.      Left lower leg: No edema.   Skin:     General: Skin is warm and dry.      Capillary Refill: Capillary refill takes less than 2 seconds.   Neurological:      General: No focal deficit present.      Mental Status: He is alert and oriented to person, place, and time.      Cranial Nerves: No cranial nerve deficit.      Motor: No weakness.   Psychiatric:         Mood and Affect: Mood normal.         Behavior: Behavior normal.         Vital Signs  ED Triage Vitals [07/13/24 0636]   Temperature Pulse Respirations Blood Pressure SpO2   (!) 97.4 °F (36.3 °C) 77 (!) 25 147/75 95 %      Temp Source Heart Rate Source Patient Position - Orthostatic VS BP Location FiO2 (%)   Oral Monitor -- -- --      Pain Score       --           Vitals:    07/13/24 0815 07/13/24 0830 07/13/24 0851 07/13/24 0930   BP: 120/71 123/74 123/75 135/77   Pulse: 64 62 60 55         Visual Acuity      ED Medications  Medications   sulfamethoxazole-trimethoprim (BACTRIM DS) 800-160 mg per tablet 1 tablet (has no administration in time range)   sodium chloride 0.9 % bolus 1,000 mL (0 mL Intravenous Stopped 7/13/24 0830)   diazepam (VALIUM) injection 5 mg (5 mg Intravenous Given 7/13/24 0719)   meclizine (ANTIVERT) tablet 25 mg (25 mg Oral Given 7/13/24 0718)   diazepam (VALIUM) injection 2.5 mg (2.5 mg Intravenous Given 7/13/24 0906)       Diagnostic Studies  Results Reviewed       Procedure Component Value Units Date/Time    Wound culture and Gram stain [[id number]] Collected: 07/13/24 0948    Lab Status: In process Specimen: Wound from Arm, Left Updated: 07/13/24 0955    HS Troponin I 2hr [[id number]]  (Normal) Collected: 07/13/24 0851    Lab Status: Final result Specimen: Blood from Arm, Right Updated: 07/13/24 0919     hs TnI 2hr 4 ng/L      Delta 2hr hsTnI 0 ng/L     HS Troponin I 4hr [[id number]]     Lab Status: No result Specimen: Blood     HS Troponin 0hr (reflex protocol) [[id number]]  (Normal) Collected: 07/13/24 0639    Lab Status: Final  result Specimen: Blood from Arm, Right Updated: 07/13/24 0707     hs TnI 0hr 4 ng/L     Comprehensive metabolic panel [[id number]]  (Abnormal) Collected: 07/13/24 0639    Lab Status: Final result Specimen: Blood from Arm, Right Updated: 07/13/24 0707     Sodium 137 mmol/L      Potassium 3.6 mmol/L      Chloride 103 mmol/L      CO2 22 mmol/L      ANION GAP 12 mmol/L      BUN 15 mg/dL      Creatinine 1.07 mg/dL      Glucose 162 mg/dL      Calcium 9.6 mg/dL      AST 14 U/L      ALT 14 U/L      Alkaline Phosphatase 55 U/L      Total Protein 6.5 g/dL      Albumin 4.1 g/dL      Total Bilirubin 0.96 mg/dL      eGFR 75 ml/min/1.73sq m     Narrative:      [employer] guidelines for Chronic Kidney Disease (CKD):     Stage 1 with normal or high GFR (GFR > 90 mL/min/1.73 square meters)    Stage 2 Mild CKD (GFR = 60-89 mL/min/1.73 square meters)    Stage 3A Moderate CKD (GFR = 45-59 mL/min/1.73 square meters)    Stage 3B Moderate CKD (GFR = 30-44 mL/min/1.73 square meters)    Stage 4 Severe CKD (GFR = 15-29 mL/min/1.73 square meters)    Stage 5 End Stage CKD (GFR <15 mL/min/1.73 square meters)  Note: GFR calculation is accurate only with a steady state creatinine    CBC and differential [[id number]]  (Abnormal) Collected: 07/13/24 0639    Lab Status: Final result Specimen: Blood from Arm, Right Updated: 07/13/24 0643     WBC 10.29 Thousand/uL      RBC 5.14 Million/uL      Hemoglobin 15.7 g/dL      Hematocrit 44.9 %      MCV 87 fL      MCH 30.5 pg      MCHC 35.0 g/dL      RDW 12.9 %      MPV 9.3 fL      Platelets 297 Thousands/uL      nRBC 0 /100 WBCs      Segmented % 47 %      Immature Grans % 1 %      Lymphocytes % 34 %      Monocytes % 10 %      Eosinophils Relative 7 %      Basophils Relative 1 %      Absolute Neutrophils 4.91 Thousands/µL      Absolute Immature Grans 0.07 Thousand/uL      Absolute Lymphocytes 3.54 Thousands/µL      Absolute Monocytes 1.00 Thousand/µL      Eosinophils Absolute 0.67  Thousand/µL      Basophils Absolute 0.10 Thousands/µL                    No orders to display              Procedures  ECG 12 Lead Documentation Only    Date/Time: 7/13/2024 6:48 AM    Performed by: Rojelio Cross MD  Authorized by: Rojelio Cross MD    Indications / Diagnosis:  Dizziness  ECG reviewed by me, the ED Provider: yes    Patient location:  ED  Interpretation:     Interpretation: normal    Rate:     ECG rate:  62    ECG rate assessment: normal    Rhythm:     Rhythm: sinus rhythm    Ectopy:     Ectopy: none    QRS:     QRS axis:  Normal    QRS intervals:  Normal  Conduction:     Conduction: normal    ST segments:     ST segments:  Normal  T waves:     T waves: normal    Incision and drain    Date/Time: 7/13/2024 7:58 AM    Performed by: Rojelio Cross MD  Authorized by: Rojelio Cross MD  Universal Protocol:  Patient identity confirmed: verbally with patient    Patient location:  ED  Location:     Type:  Abscess    Location:  Upper extremity    Upper extremity location:  L wrist  Pre-procedure details:     Skin preparation:  Betadine  Anesthesia (see MAR for exact dosages):     Anesthesia method:  Local infiltration    Local anesthetic:  Lidocaine 1% WITH epi  Procedure details:     Complexity:  Simple    Incision types:  Stab incision    Scalpel blade:  11    Approach:  Open    Incision depth:  Subcutaneous    Wound management:  Probed and deloculated and irrigated with saline    Drainage:  Purulent    Drainage amount:  Scant    Wound treatment:  Wound left open    Packing materials:  None  Post-procedure details:     Patient tolerance of procedure:  Tolerated well, no immediate complications           ED Course                                               Medical Decision Making  Recurrent episode of vertigo.  Will hydrate, treat empirically, check cardiac profile    Amount and/or Complexity of Data Reviewed  Labs: ordered.    Risk  Prescription drug management.                  Disposition  Final diagnoses:   Vertigo     Time reflects when diagnosis was documented in both MDM as applicable and the Disposition within this note       Time User Action Codes Description Comment    7/13/2024 10:13 AM Rojelio Cross Add [R42] Vertigo           ED Disposition       ED Disposition   Discharge    Condition   Stable    Date/Time   Sat Jul 13, 2024 10:13 AM    Comment   Shaheen Nagy discharge to home/self care.                   Follow-up Information       Follow up With Specialties Details Why Contact Info    Venkat Salas DO Family Medicine Schedule an appointment as soon as possible for a visit   [address] [place] [address]  [phone]              Patient's Medications   Discharge Prescriptions    DIAZEPAM (VALIUM) 5 MG TABLET    Take 1 tablet (5 mg total) by mouth 2 (two) times a day for 5 days       Start Date: 7/13/2024 End Date: 7/18/2024       Order Dose: 5 mg       Quantity: 10 tablet    Refills: 0    HYDROXYZINE HCL (ATARAX) 25 MG TABLET    Take 1 tablet (25 mg total) by mouth every 6 (six) hours       Start Date: 7/13/2024 End Date: --       Order Dose: 25 mg       Quantity: 12 tablet    Refills: 0    SULFAMETHOXAZOLE-TRIMETHOPRIM (BACTRIM DS) 800-160 MG PER TABLET    Take 1 tablet by mouth 2 (two) times a day for 7 days smx-tmp DS (BACTRIM) 800-160 mg tabs (1tab q12 D10)       Start Date: 7/13/2024 End Date: 7/20/2024       Order Dose: 1 tablet       Quantity: 14 tablet    Refills: 0       No discharge procedures on file.    PDMP Review       None            ED Provider  Electronically Signed by             Rojelio Cross MD  07/13/24 8879

## 2024-07-15 LAB
ATRIAL RATE: 62 BPM
BACTERIA WND AEROBE CULT: ABNORMAL
GRAM STN SPEC: ABNORMAL
P AXIS: 54 DEGREES
PR INTERVAL: 154 MS
QRS AXIS: 4 DEGREES
QRSD INTERVAL: 84 MS
QT INTERVAL: 416 MS
QTC INTERVAL: 422 MS
T WAVE AXIS: 43 DEGREES
VENTRICULAR RATE: 62 BPM

## 2024-07-15 PROCEDURE — 93010 ELECTROCARDIOGRAM REPORT: CPT | Performed by: INTERNAL MEDICINE

## 2025-03-24 ENCOUNTER — OFFICE VISIT (OUTPATIENT)
Dept: FAMILY MEDICINE CLINIC | Facility: CLINIC | Age: 60
End: 2025-03-24
Payer: COMMERCIAL

## 2025-03-24 VITALS
SYSTOLIC BLOOD PRESSURE: 128 MMHG | TEMPERATURE: 96.2 F | HEIGHT: 68 IN | WEIGHT: 168 LBS | RESPIRATION RATE: 20 BRPM | HEART RATE: 76 BPM | DIASTOLIC BLOOD PRESSURE: 74 MMHG | BODY MASS INDEX: 25.46 KG/M2

## 2025-03-24 DIAGNOSIS — E78.49 OTHER HYPERLIPIDEMIA: ICD-10-CM

## 2025-03-24 DIAGNOSIS — I10 ESSENTIAL HYPERTENSION: ICD-10-CM

## 2025-03-24 DIAGNOSIS — M25.50 POLYARTHRALGIA: Primary | ICD-10-CM

## 2025-03-24 PROCEDURE — 99214 OFFICE O/P EST MOD 30 MIN: CPT | Performed by: NURSE PRACTITIONER

## 2025-03-24 RX ORDER — METHOCARBAMOL 750 MG/1
TABLET, FILM COATED ORAL
COMMUNITY
Start: 2025-03-21

## 2025-03-24 NOTE — PROGRESS NOTES
Name: Shaheen Nagy      : 1965      MRN: [id number]  Encounter Provider: NEPTALI Busby  Encounter Date: 3/24/2025   Encounter department: [facility]  :  Assessment & Plan  Polyarthralgia  Unclear etiology, he did take 40 mg of prednisone in the office at time of visit, and will have labs checked as below.  Questionable PMR?  Not likely to be a side effect of the prednisone, possibly related to methocarbamol, advised to continue to hold additional doses.  Advise ER for any worsening or new symptoms.  Will touch base tomorrow with an update  Orders:  •  CBC and differential; Future  •  Comprehensive metabolic panel; Future  •  Lyme Total AB W Reflex to IGM/IGG; Future  •  CK; Future  •  Sedimentation rate, automated; Future  •  C-reactive protein; Future    Essential hypertension  Stable       Other hyperlipidemia  He is on a statin, no recent dose changes, has tolerated this well              History of Present Illness   Here today with complaints of diffuse joint pains and hand swelling.  Reports last week, he started to develop low back pain that radiated into his shoulder blades and neck, for which he saw his orthopedist.  He was prescribed 20 mg of prednisone in addition to methocarbamol.  Reports 2 days later, he started to develop pain in all of his joints, including elbows, hands, thumbs, ankles, hips, and back.  This has progressed since then, making it extremely difficult to use his joints including walking and using his hands.  He called his orthopedist, who instructed him not to take either dose today, and was told to follow-up here.  No body rashes, associated fevers, GI symptoms, chest pain, or breathing difficulties.  States he is always cold, has chills, but no associated fevers.  He has taken prednisone several times in the past without any difficulty.  His hands are notably swollen, thinks his knees may be swollen.  No other new medications or supplements.  He  "sees a cardiologist for hypertension and hyperlipidemia      Review of Systems   Constitutional: Negative.    Respiratory: Negative.     Cardiovascular: Negative.    Gastrointestinal: Negative.    Genitourinary: Negative.    Musculoskeletal:  Positive for arthralgias.   Neurological: Negative.        Objective   /74   Pulse 76   Temp (!) 96.2 °F (35.7 °C)   Resp 20   Ht 5' 8\" (1.727 m)   Wt 76.2 kg (168 lb)   BMI 25.54 kg/m²      Physical Exam  Vitals and nursing note reviewed.   Constitutional:       General: He is not in acute distress.     Appearance: Normal appearance.   HENT:      Head: Normocephalic and atraumatic.   Eyes:      Conjunctiva/sclera: Conjunctivae normal.   Neck:      Vascular: No carotid bruit.   Cardiovascular:      Rate and Rhythm: Normal rate and regular rhythm.      Pulses: Normal pulses.           Radial pulses are 2+ on the right side and 2+ on the left side.      Heart sounds: Normal heart sounds. No murmur heard.     Comments: Diffuse swelling involving both hands  Pulmonary:      Effort: Pulmonary effort is normal.      Breath sounds: Normal breath sounds.   Musculoskeletal:      Comments: Joints extremely tender with minimal range of motion and on deep palpation   Skin:     General: Skin is warm and dry.   Neurological:      Mental Status: He is alert.   Psychiatric:         Mood and Affect: Mood normal.         Behavior: Behavior normal.         "

## 2025-03-25 ENCOUNTER — TELEPHONE (OUTPATIENT)
Age: 60
End: 2025-03-25

## 2025-03-25 DIAGNOSIS — M25.50 POLYARTHRALGIA: Primary | ICD-10-CM

## 2025-03-25 DIAGNOSIS — Z13.6 SCREENING FOR CARDIOVASCULAR CONDITION: ICD-10-CM

## 2025-03-25 NOTE — TELEPHONE ENCOUNTER
Patients spouse called in regards Shaheen labs.  Courtney is asking for an order to be placed to check for gout as well.  Courtney would like that order placed today to get all the labs completed.    Please advise and notify spouse  Thank you

## 2025-03-25 NOTE — TELEPHONE ENCOUNTER
L/M for pt to call back, wanted to check in on his symptoms and how he did with the Prednisone.  I did add on the uric acid level for gout, although unlikely. NEPTALI Busby

## 2025-03-26 LAB
ALBUMIN SERPL-MCNC: 3.9 G/DL (ref 3.9–4.9)
ALP SERPL-CCNC: 72 IU/L (ref 44–121)
ALT SERPL-CCNC: 15 IU/L (ref 0–44)
ANA TITR SER IF: NEGATIVE {TITER}
AST SERPL-CCNC: 11 IU/L (ref 0–40)
B BURGDOR IGG+IGM SER QL IA: NEGATIVE
BASOPHILS # BLD AUTO: 0.1 X10E3/UL (ref 0–0.2)
BASOPHILS NFR BLD AUTO: 1 %
BILIRUB SERPL-MCNC: 0.7 MG/DL (ref 0–1.2)
BUN SERPL-MCNC: 16 MG/DL (ref 8–27)
BUN/CREAT SERPL: 16 (ref 10–24)
CALCIUM SERPL-MCNC: 9.6 MG/DL (ref 8.6–10.2)
CHLORIDE SERPL-SCNC: 105 MMOL/L (ref 96–106)
CK SERPL-CCNC: 34 U/L (ref 41–331)
CO2 SERPL-SCNC: 24 MMOL/L (ref 20–29)
CREAT SERPL-MCNC: 0.97 MG/DL (ref 0.76–1.27)
CRP SERPL-MCNC: 37 MG/L (ref 0–10)
EGFR: 89 ML/MIN/1.73
EOSINOPHIL # BLD AUTO: 0 X10E3/UL (ref 0–0.4)
EOSINOPHIL NFR BLD AUTO: 1 %
ERYTHROCYTE [DISTWIDTH] IN BLOOD BY AUTOMATED COUNT: 13 % (ref 11.6–15.4)
ERYTHROCYTE [SEDIMENTATION RATE] IN BLOOD BY WESTERGREN METHOD: 4 MM/HR (ref 0–30)
GLOBULIN SER-MCNC: 2.4 G/DL (ref 1.5–4.5)
GLUCOSE SERPL-MCNC: 112 MG/DL (ref 70–99)
HCT VFR BLD AUTO: 39.6 % (ref 37.5–51)
HGB BLD-MCNC: 13.6 G/DL (ref 13–17.7)
IMM GRANULOCYTES # BLD: 0.2 X10E3/UL (ref 0–0.1)
IMM GRANULOCYTES NFR BLD: 3 %
LABORATORY COMMENT REPORT: NORMAL
LYMPHOCYTES # BLD AUTO: 1 X10E3/UL (ref 0.7–3.1)
LYMPHOCYTES NFR BLD AUTO: 14 %
MCH RBC QN AUTO: 30.4 PG (ref 26.6–33)
MCHC RBC AUTO-ENTMCNC: 34.3 G/DL (ref 31.5–35.7)
MCV RBC AUTO: 88 FL (ref 79–97)
MONOCYTES # BLD AUTO: 0.6 X10E3/UL (ref 0.1–0.9)
MONOCYTES NFR BLD AUTO: 8 %
NEUTROPHILS # BLD AUTO: 5.2 X10E3/UL (ref 1.4–7)
NEUTROPHILS NFR BLD AUTO: 73 %
PLATELET # BLD AUTO: 279 X10E3/UL (ref 150–450)
POTASSIUM SERPL-SCNC: 4.5 MMOL/L (ref 3.5–5.2)
PROT SERPL-MCNC: 6.3 G/DL (ref 6–8.5)
RBC # BLD AUTO: 4.48 X10E6/UL (ref 4.14–5.8)
SODIUM SERPL-SCNC: 140 MMOL/L (ref 134–144)
URATE SERPL-MCNC: 4.1 MG/DL (ref 3.8–8.4)
WBC # BLD AUTO: 7.1 X10E3/UL (ref 3.4–10.8)

## 2025-03-26 RX ORDER — PREDNISONE 20 MG/1
TABLET ORAL
Qty: 20 TABLET | Refills: 0 | Status: SHIPPED | OUTPATIENT
Start: 2025-03-26 | End: 2025-04-04

## 2025-03-26 NOTE — TELEPHONE ENCOUNTER
Spoke with patient, he took the 40 mg of prednisone in the office on Monday followed by 40 mg yesterday.  Reports that he did have some improvement in his joint pains, was able to walk and use his hands and the swelling in his hands did go down.  He went for his labs yesterday at [facility].  This morning he took another 40 mg, but has swelling of his left hand, left foot, and the left side of his jaw is painful.  No other new symptoms.  Will send over new prednisone taper and await results of labs. NEPTALI Busby

## 2025-03-27 ENCOUNTER — TELEPHONE (OUTPATIENT)
Age: 60
End: 2025-03-27

## 2025-03-27 DIAGNOSIS — R79.82 ELEVATED C-REACTIVE PROTEIN (CRP): ICD-10-CM

## 2025-03-27 DIAGNOSIS — M25.50 POLYARTHRALGIA: Primary | ICD-10-CM

## 2025-03-27 NOTE — TELEPHONE ENCOUNTER
Spoke with patient, reviewed labs, CRP elevated, with normal sed rate.  He is feeling substantially better with the prednisone.  Concern for inflammatory arthritis such as rheumatoid arthritis.  Will check additional labs, order was sent to [facility].  Also placed a referral for rheumatology consult.  He will follow-up for any worsening or new symptoms. NEPTALI Busby

## 2025-03-27 NOTE — TELEPHONE ENCOUNTER
Pt called to ask if Paula GUNDERSON had reviewed his bloodwork results.  He has seen them in his MyChart.  Informed pt that there were no result notes to relay at this time, but that we would call him once Paula GUNDERSON had reviewed them and provided her recommendations.

## 2025-04-03 LAB
ANA TITR SER IF: NEGATIVE {TITER}
B BURGDOR AB SER IA.MTTT-IMP: ABNORMAL
B BURGDOR IGG SERPL QL IA: POSITIVE
B BURGDOR IGG+IGM SER QL IA: NORMAL
B BURGDOR IGM SERPL QL IA: NEGATIVE
LABORATORY COMMENT REPORT: NORMAL
RHEUMATOID FACT SERPL-ACNC: <10 IU/ML

## 2025-04-04 ENCOUNTER — RESULTS FOLLOW-UP (OUTPATIENT)
Dept: FAMILY MEDICINE CLINIC | Facility: CLINIC | Age: 60
End: 2025-04-04

## 2025-04-04 ENCOUNTER — TELEPHONE (OUTPATIENT)
Dept: FAMILY MEDICINE CLINIC | Facility: CLINIC | Age: 60
End: 2025-04-04

## 2025-04-04 DIAGNOSIS — M25.50 POLYARTHRALGIA: Primary | ICD-10-CM

## 2025-04-04 RX ORDER — PREDNISONE 20 MG/1
20 TABLET ORAL DAILY
Qty: 30 TABLET | Refills: 0 | Status: SHIPPED | OUTPATIENT
Start: 2025-04-04 | End: 2025-04-08 | Stop reason: SDUPTHER

## 2025-04-04 RX ORDER — DOXYCYCLINE HYCLATE 100 MG
100 TABLET ORAL 2 TIMES DAILY
Qty: 42 TABLET | Refills: 0 | Status: SHIPPED | OUTPATIENT
Start: 2025-04-04 | End: 2025-04-25

## 2025-04-04 NOTE — TELEPHONE ENCOUNTER
Spoke with patient regarding labs.   The DEE DEE and rheumatoid factor were negative. [facility] repeated a Lyme test, although this was not ordered.  This result was positive for IgG infection.  Lyme test done last week was negative for IgG and IgM.  He is down to 20 mg of prednisone, feels like some of his pains are coming back again.  He is scheduled with rheumatology but not until August.  I will send over additional tablets of prednisone 20 mg for him to continue, and also start on doxycycline as a precaution.  Will reach out to rheumatology for lab review to determine additional testing or sooner follow-up. NEPTALI Busby

## 2025-04-07 ENCOUNTER — TELEPHONE (OUTPATIENT)
Dept: RHEUMATOLOGY | Facility: CLINIC | Age: 60
End: 2025-04-07

## 2025-04-08 ENCOUNTER — TELEMEDICINE (OUTPATIENT)
Dept: RHEUMATOLOGY | Facility: CLINIC | Age: 60
End: 2025-04-08
Payer: COMMERCIAL

## 2025-04-08 ENCOUNTER — TELEPHONE (OUTPATIENT)
Dept: RHEUMATOLOGY | Facility: CLINIC | Age: 60
End: 2025-04-08

## 2025-04-08 DIAGNOSIS — M35.3 POLYMYALGIA RHEUMATICA (HCC): Primary | ICD-10-CM

## 2025-04-08 DIAGNOSIS — M47.816 SPONDYLOSIS OF LUMBAR REGION WITHOUT MYELOPATHY OR RADICULOPATHY: ICD-10-CM

## 2025-04-08 DIAGNOSIS — Z79.52 CURRENT CHRONIC USE OF SYSTEMIC STEROIDS: ICD-10-CM

## 2025-04-08 DIAGNOSIS — M25.50 DIFFUSE ARTHRALGIA: ICD-10-CM

## 2025-04-08 PROCEDURE — 99205 OFFICE O/P NEW HI 60 MIN: CPT | Performed by: INTERNAL MEDICINE

## 2025-04-08 RX ORDER — PREDNISONE 20 MG/1
20 TABLET ORAL DAILY
Qty: 30 TABLET | Refills: 0 | Status: SHIPPED | OUTPATIENT
Start: 2025-04-08

## 2025-04-08 NOTE — ASSESSMENT & PLAN NOTE
- Side effects include but are not limited to risk for infections, cataracts/glaucoma, hypertension, diabetes, osteoporosis, avascular necrosis and gastritis.  For now he will start a daily PPI over-the-counter for GI prophylaxis.  At this dose and depending on the steroid taper he may not require osteoporosis or PJP prophylaxis.  There are also no medical contraindications to initial use of chronic steroids.

## 2025-04-08 NOTE — LETTER
2025     NEPTALI Busby  [address] [address]    Patient: Shaheen Nagy   YOB: 1965   Date of Visit: 2025       Dear NEPTALI Cobb:    Thank you for referring Shaheen aNgy to me for evaluation. Below are my notes for this consultation.    If you have questions, please do not hesitate to call me. I look forward to following your patient along with you.         Sincerely,        Emy Rust MD        CC: No Recipients    Emy Rust MD  2025 11:25 AM  Sign when Signing Visit  Virtual Regular VisitName: Shaheen Nagy      : 1965      MRN: [id number]  Encounter Provider: Emy Rust MD  Encounter Date: 2025   Encounter department: [facility]  :  Assessment & Plan  Polymyalgia rheumatica (HCC)  Mr. Nagy is a 60-year-old male with history significant for lumbar degenerative arthritis who presents for an evaluation of abrupt onset joint pains with stiffness and an elevated C-reactive protein of 37.  He is referred by NEPTALI Busby for a rheumatology consult.    - Shaheen presents today for an evaluation of abrupt onset diffuse arthralgias/stiffness with bilateral hand swelling he experienced on 3/21 in association with a significantly elevated C-reactive protein and steroid responsiveness [although to higher doses of prednisone].  Given this constellation of features, his diagnosis appears consistent with polymyalgia rheumatica.  I reviewed this diagnosis with him and recommend continuation of steroid therapy, but I would like him to lower the dose to 20 mg once daily and stay on this until a follow-up visit in 4 weeks.  Depending on his response I will guide a taper.  In the interim if he is to experience any significant change in symptoms I requested he make me aware.  He is not reporting any features concerning for temporal arteritis.  He will update  inflammatory markers prior to the follow-up visit.    Orders:  •  predniSONE 20 mg tablet; Take 1 tablet (20 mg total) by mouth daily    Diffuse arthralgia    Orders:  •  Sjogren's Antibodies; Future  •  Sedimentation rate, automated; Future  •  C-reactive protein; Future  •  Rheumatoid Arthritis Profile; Future  •  Anti-neutrophilic cytoplasmic antibody; Future    Current chronic use of systemic steroids  - Side effects include but are not limited to risk for infections, cataracts/glaucoma, hypertension, diabetes, osteoporosis, avascular necrosis and gastritis.  For now he will start a daily PPI over-the-counter for GI prophylaxis.  At this dose and depending on the steroid taper he may not require osteoporosis or PJP prophylaxis.  There are also no medical contraindications to initial use of chronic steroids.         Spondylosis of lumbar region without myelopathy or radiculopathy         Polymyalgia rheumatica (HCC)         Diffuse arthralgia         Current chronic use of systemic steroids           Patient's rheumatologic disease(s) threaten long-term function if not appropriately managed.      History of Present Illness    HPI    Mr. Nagy is a 60-year-old male with history significant for lumbar degenerative arthritis who presents for an evaluation of abrupt onset joint pains with stiffness and an elevated C-reactive protein of 37.  He is referred by NEPTALI Busby for a rheumatology consult.    Patient reports for approximately 10 years he has been followed by orthopedic spine surgery for a longstanding history of lumbar degenerative arthritis and would receive periodic interventional procedures approximately every 2 years when his symptoms flared.  At times he would also receive short courses of oral steroids.  He mentions on 3/21 he started to experience a recurrence of spinal pain that was radiating into his neck.  He was seen by his orthopedic spine surgeon and started on a taper of oral  prednisone.  He reports 2 days following this he woke up in the morning with significant joint pains affecting his hands, elbows, knees, ankles and feet associated with significant swelling of his bilateral hands and states by the next day due to the marked stiffness he was unable to move and his wife had to take him in to the medical office using a wheelchair.  He was seen by his PCP on 3/24 and was prescribed a new course of steroids starting at 60 mg with a taper of 20 mg every 3 days.  He reports within 2 days of taking the 60 mg once daily he felt much better and his symptoms had resolved.  He was also able to tolerate the 40 mg once daily without any issues, but states when he went down to 20 mg once daily he started to experience some degree of recurrence of his joint pains and as he was worried that his symptoms would escalate he contacted his PCP who advised him to increase the prednisone back to 40 mg once daily and he has been on this since last week.    He reports on the current dose of 40 mg once daily he has been feeling well without any concerning joint pains, swelling or stiffness.  He mentions prior to the onset of his symptoms he had a mild viral upper respiratory tract infection.  No other illnesses/infections/immunizations.  He did also experience chills without any recorded fevers.  No history of new headaches, scalp tenderness, recent vision changes unrelated to refraction errors or jaw claudication.  No history of inflammatory eye disease, skin rash/psoriasis or inflammatory bowel disease.  He reports a history of osteoarthritis in his mother.    Labs done on 3/25 once he was already started on prednisone showed an elevated C-reactive protein of 37.  An DEE DEE screen, ESR, rheumatoid factor, uric acid, CK, CBC and CMP were normal.  He initially had a negative Lyme antibody profile, but this was repeated 1 week later and showed a positive IgG antibody with a negative IgM.  He cannot recall any  bull's-eye rash or removal of the tick.  He is also being treated for possible Lyme disease and was started on doxycycline 100 mg twice daily for 3 weeks.      Review of Systems  Constitutional: Negative for weight change, fevers, chills, night sweats, fatigue.  ENT/Mouth: Negative for hearing changes, ear pain, nasal congestion, sinus pain, hoarseness, sore throat, rhinorrhea, swallowing difficulty.   Eyes: Negative for pain, redness, discharge, vision changes.   Cardiovascular: Negative for chest pain, SOB, palpitations.   Respiratory: Negative for cough, sputum, wheezing, dyspnea.   Gastrointestinal: Negative for nausea, vomiting, diarrhea, constipation, pain, heartburn.  Genitourinary: Negative for dysuria, urinary frequency, hematuria.   Musculoskeletal: As per HPI.  Skin: Negative for skin rash, color changes.   Neuro: Negative for weakness, numbness, tingling, loss of consciousness.   Psych: Negative for anxiety, depression.   Heme/Lymph: Negative for easy bruising, bleeding, lymphadenopathy.      Objective  There were no vitals taken for this visit.    Physical Exam  General: Well appearing, well nourished, in no distress. Oriented x 3, normal mood and affect.  Skin: Good turgor, no rash, unusual bruising or prominent lesions.  Hair: Normal texture and distribution.  HEENT:  Head: Normocephalic, atraumatic.  Eyes: Conjunctiva clear, sclera non-icteric, EOM intact.  Nose: No external lesions.  Neck: Supple.  Neurologic: Alert and oriented. No focal neurological deficits appreciated.   Psychiatric: Normal mood and affect.     Administrative Statements  Encounter provider Emy Rust MD    The Patient is located at Home and in the following state in which I hold an active license [place].    The patient was identified by name and date of birth. Shaheen COELLO Yakov was informed that this is a telemedicine visit and that the visit is being conducted through the Epic Embedded platform. He agrees to proceed..  My  office door was closed. No one else was in the room.  He acknowledged consent and understanding of privacy and security of the video platform. The patient has agreed to participate and understands they can discontinue the visit at any time.    I have spent a total time of 50 minutes in caring for this patient on the day of the visit/encounter including Diagnostic results, Prognosis, Risks and benefits of tx options, Instructions for management, Patient and family education, Impressions, Documenting in the medical record, Reviewing/placing orders in the medical record (including tests, medications, and/or procedures), and Obtaining or reviewing history  , not including the time spent for establishing the audio/video connection.

## 2025-04-08 NOTE — PROGRESS NOTES
Virtual Regular VisitName: Shaheen Nagy      : 1965      MRN: [id number]  Encounter Provider: Emy Rust MD  Encounter Date: 2025   Encounter department: [facility]  :  Assessment & Plan  Polymyalgia rheumatica (HCC)  Mr. Nagy is a 60-year-old male with history significant for lumbar degenerative arthritis who presents for an evaluation of abrupt onset joint pains with stiffness and an elevated C-reactive protein of 37.  He is referred by NEPTALI Busby for a rheumatology consult.    - Shaheen presents today for an evaluation of abrupt onset diffuse arthralgias/stiffness with bilateral hand swelling he experienced on 3/21 in association with a significantly elevated C-reactive protein and steroid responsiveness [although to higher doses of prednisone].  Given this constellation of features, his diagnosis appears consistent with polymyalgia rheumatica.  I reviewed this diagnosis with him and recommend continuation of steroid therapy, but I would like him to lower the dose to 20 mg once daily and stay on this until a follow-up visit in 4 weeks.  Depending on his response I will guide a taper.  In the interim if he is to experience any significant change in symptoms I requested he make me aware.  He is not reporting any features concerning for temporal arteritis.  He will update inflammatory markers prior to the follow-up visit.    Orders:    predniSONE 20 mg tablet; Take 1 tablet (20 mg total) by mouth daily    Diffuse arthralgia    Orders:    Sjogren's Antibodies; Future    Sedimentation rate, automated; Future    C-reactive protein; Future    Rheumatoid Arthritis Profile; Future    Anti-neutrophilic cytoplasmic antibody; Future    Current chronic use of systemic steroids  - Side effects include but are not limited to risk for infections, cataracts/glaucoma, hypertension, diabetes, osteoporosis, avascular necrosis and gastritis.  For now he will  start a daily PPI over-the-counter for GI prophylaxis.  At this dose and depending on the steroid taper he may not require osteoporosis or PJP prophylaxis.  There are also no medical contraindications to initial use of chronic steroids.         Spondylosis of lumbar region without myelopathy or radiculopathy         Polymyalgia rheumatica (HCC)         Diffuse arthralgia         Current chronic use of systemic steroids           Patient's rheumatologic disease(s) threaten long-term function if not appropriately managed.      History of Present Illness     HPI    Mr. Nagy is a 60-year-old male with history significant for lumbar degenerative arthritis who presents for an evaluation of abrupt onset joint pains with stiffness and an elevated C-reactive protein of 37.  He is referred by NEPTALI Busby for a rheumatology consult.    Patient reports for approximately 10 years he has been followed by orthopedic spine surgery for a longstanding history of lumbar degenerative arthritis and would receive periodic interventional procedures approximately every 2 years when his symptoms flared.  At times he would also receive short courses of oral steroids.  He mentions on 3/21 he started to experience a recurrence of spinal pain that was radiating into his neck.  He was seen by his orthopedic spine surgeon and started on a taper of oral prednisone.  He reports 2 days following this he woke up in the morning with significant joint pains affecting his hands, elbows, knees, ankles and feet associated with significant swelling of his bilateral hands and states by the next day due to the marked stiffness he was unable to move and his wife had to take him in to the medical office using a wheelchair.  He was seen by his PCP on 3/24 and was prescribed a new course of steroids starting at 60 mg with a taper of 20 mg every 3 days.  He reports within 2 days of taking the 60 mg once daily he felt much better and his symptoms  had resolved.  He was also able to tolerate the 40 mg once daily without any issues, but states when he went down to 20 mg once daily he started to experience some degree of recurrence of his joint pains and as he was worried that his symptoms would escalate he contacted his PCP who advised him to increase the prednisone back to 40 mg once daily and he has been on this since last week.    He reports on the current dose of 40 mg once daily he has been feeling well without any concerning joint pains, swelling or stiffness.  He mentions prior to the onset of his symptoms he had a mild viral upper respiratory tract infection.  No other illnesses/infections/immunizations.  He did also experience chills without any recorded fevers.  No history of new headaches, scalp tenderness, recent vision changes unrelated to refraction errors or jaw claudication.  No history of inflammatory eye disease, skin rash/psoriasis or inflammatory bowel disease.  He reports a history of osteoarthritis in his mother.    Labs done on 3/25 once he was already started on prednisone showed an elevated C-reactive protein of 37.  An DEE DEE screen, ESR, rheumatoid factor, uric acid, CK, CBC and CMP were normal.  He initially had a negative Lyme antibody profile, but this was repeated 1 week later and showed a positive IgG antibody with a negative IgM.  He cannot recall any bull's-eye rash or removal of the tick.  He is also being treated for possible Lyme disease and was started on doxycycline 100 mg twice daily for 3 weeks.      Review of Systems  Constitutional: Negative for weight change, fevers, chills, night sweats, fatigue.  ENT/Mouth: Negative for hearing changes, ear pain, nasal congestion, sinus pain, hoarseness, sore throat, rhinorrhea, swallowing difficulty.   Eyes: Negative for pain, redness, discharge, vision changes.   Cardiovascular: Negative for chest pain, SOB, palpitations.   Respiratory: Negative for cough, sputum, wheezing, dyspnea.    Gastrointestinal: Negative for nausea, vomiting, diarrhea, constipation, pain, heartburn.  Genitourinary: Negative for dysuria, urinary frequency, hematuria.   Musculoskeletal: As per HPI.  Skin: Negative for skin rash, color changes.   Neuro: Negative for weakness, numbness, tingling, loss of consciousness.   Psych: Negative for anxiety, depression.   Heme/Lymph: Negative for easy bruising, bleeding, lymphadenopathy.      Objective   There were no vitals taken for this visit.    Physical Exam  General: Well appearing, well nourished, in no distress. Oriented x 3, normal mood and affect.  Skin: Good turgor, no rash, unusual bruising or prominent lesions.  Hair: Normal texture and distribution.  HEENT:  Head: Normocephalic, atraumatic.  Eyes: Conjunctiva clear, sclera non-icteric, EOM intact.  Nose: No external lesions.  Neck: Supple.  Neurologic: Alert and oriented. No focal neurological deficits appreciated.   Psychiatric: Normal mood and affect.     Administrative Statements   Encounter provider Emy Rust MD    The Patient is located at Home and in the [facility] state in which I hold an active license [place].    The patient was identified by name and date of birth. Shaheen Nagy was informed that this is a telemedicine visit and that the visit is being conducted through the Epic Embedded platform. He agrees to proceed..  My office door was closed. No one else was in the room.  He acknowledged consent and understanding of privacy and security of the video platform. The patient has agreed to participate and understands they can discontinue the visit at any time.    I have spent a total time of 50 minutes in caring for this patient on the day of the visit/encounter including Diagnostic results, Prognosis, Risks and benefits of tx options, Instructions for management, Patient and family education, Impressions, Documenting in the medical record, Reviewing/placing orders in the medical record (including  tests, medications, and/or procedures), and Obtaining or reviewing history  , not including the time spent for establishing the audio/video connection.

## 2025-04-08 NOTE — ASSESSMENT & PLAN NOTE
Mr. Nagy is a 60-year-old male with history significant for lumbar degenerative arthritis who presents for an evaluation of abrupt onset joint pains with stiffness and an elevated C-reactive protein of 37.  He is referred by NEPTALI Busby for a rheumatology consult.    - Shaheen presents today for an evaluation of abrupt onset diffuse arthralgias/stiffness with bilateral hand swelling he experienced on 3/21 in association with a significantly elevated C-reactive protein and steroid responsiveness [although to higher doses of prednisone].  Given this constellation of features, his diagnosis appears consistent with polymyalgia rheumatica.  I reviewed this diagnosis with him and recommend continuation of steroid therapy, but I would like him to lower the dose to 20 mg once daily and stay on this until a follow-up visit in 4 weeks.  Depending on his response I will guide a taper.  In the interim if he is to experience any significant change in symptoms I requested he make me aware.  He is not reporting any features concerning for temporal arteritis.  He will update inflammatory markers prior to the follow-up visit.    Orders:    predniSONE 20 mg tablet; Take 1 tablet (20 mg total) by mouth daily

## 2025-04-08 NOTE — TELEPHONE ENCOUNTER
Please schedule 4-week follow-up as a virtual visit on a Tuesday or Friday.  Okay to overbook if needed.

## 2025-04-21 DIAGNOSIS — M35.3 POLYMYALGIA RHEUMATICA (HCC): Primary | ICD-10-CM

## 2025-04-21 RX ORDER — METHYLPREDNISOLONE 4 MG/1
16 TABLET ORAL
Qty: 120 TABLET | Refills: 0 | Status: SHIPPED | OUTPATIENT
Start: 2025-04-21

## 2025-04-28 LAB — ERYTHROCYTE [SEDIMENTATION RATE] IN BLOOD BY WESTERGREN METHOD: 2 MM/HR (ref 0–30)

## 2025-04-29 LAB
CCP IGA+IGG SERPL IA-ACNC: 10 UNITS (ref 0–19)
CRP SERPL-MCNC: <1 MG/L (ref 0–10)
ENA SS-A AB SER-ACNC: <0.2 AI (ref 0–0.9)
ENA SS-B AB SER-ACNC: <0.2 AI (ref 0–0.9)

## 2025-04-30 LAB
C-ANCA TITR SER IF: NORMAL TITER
MYELOPEROXIDASE AB SER IA-ACNC: <0.2 UNITS (ref 0–0.9)
P-ANCA ATYPICAL TITR SER IF: NORMAL TITER
P-ANCA TITR SER IF: NORMAL TITER
PROTEINASE3 AB SER IA-ACNC: <0.2 UNITS (ref 0–0.9)

## 2025-05-02 ENCOUNTER — TELEMEDICINE (OUTPATIENT)
Dept: RHEUMATOLOGY | Facility: CLINIC | Age: 60
End: 2025-05-02
Payer: COMMERCIAL

## 2025-05-02 ENCOUNTER — TELEPHONE (OUTPATIENT)
Dept: RHEUMATOLOGY | Facility: CLINIC | Age: 60
End: 2025-05-02

## 2025-05-02 DIAGNOSIS — M06.00 SERONEGATIVE RHEUMATOID ARTHRITIS (HCC): Primary | ICD-10-CM

## 2025-05-02 DIAGNOSIS — Z79.52 CURRENT CHRONIC USE OF SYSTEMIC STEROIDS: ICD-10-CM

## 2025-05-02 DIAGNOSIS — Z11.1 SCREENING-PULMONARY TB: ICD-10-CM

## 2025-05-02 DIAGNOSIS — M47.816 SPONDYLOSIS OF LUMBAR REGION WITHOUT MYELOPATHY OR RADICULOPATHY: ICD-10-CM

## 2025-05-02 DIAGNOSIS — Z79.631 METHOTREXATE, LONG TERM, CURRENT USE: ICD-10-CM

## 2025-05-02 DIAGNOSIS — M35.3 POLYMYALGIA RHEUMATICA (HCC): ICD-10-CM

## 2025-05-02 DIAGNOSIS — Z11.59 NEED FOR HEPATITIS C SCREENING TEST: ICD-10-CM

## 2025-05-02 PROCEDURE — 99215 OFFICE O/P EST HI 40 MIN: CPT | Performed by: INTERNAL MEDICINE

## 2025-05-02 RX ORDER — METHOTREXATE 2.5 MG/1
TABLET ORAL
Qty: 96 TABLET | Refills: 1 | Status: SHIPPED | OUTPATIENT
Start: 2025-05-05

## 2025-05-02 RX ORDER — FOLIC ACID 1 MG/1
1 TABLET ORAL DAILY
Qty: 90 TABLET | Refills: 3 | Status: SHIPPED | OUTPATIENT
Start: 2025-05-02

## 2025-05-02 NOTE — ASSESSMENT & PLAN NOTE
Mr. Nagy is a 60-year-old male with history significant for lumbar degenerative arthritis who presents for a follow up of abrupt onset joint pains with stiffness and an elevated C-reactive protein of 37/polymyalgia rheumatica considered in 4/25.  He is currently on Medrol 16 mg once daily.      - Shaheen presents today for a follow up of abrupt onset diffuse arthralgias/stiffness with bilateral hand swelling he experienced on 3/21 in association with a significantly elevated C-reactive protein and steroid responsiveness [although to higher doses of prednisone].  Given this constellation of features at our initial visit I considered the possibility of polymyalgia rheumatica and recommended maintaining prednisone 20 mg once daily before starting a taper.    - He reports this dose of prednisone has been ineffective for him and he is still experiencing hand and foot cramping/locking and swelling that seems to be activity related.  Given ongoing complaints on 20 mg of prednisone with normalization of the inflammation markers his presentation no longer seems consistent with polymyalgia rheumatica.  His symptoms also appear atypical for an inflammatory arthritis, given that they are triggered with activities, with cramping as opposed to arthralgias and lack of responsiveness to this dose of steroids, but given the distribution of symptoms involving his hands and feet with occurrences of swelling (I requested he send me a picture on [employer] so I can review), I cannot rule out the possibility of seronegative rheumatoid arthritis and recommend initiation of DMARDs.      - I will start him on methotrexate titrated to 20 mg once weekly with folic acid 1 mg daily.  I reviewed the side effects of methotrexate including but not limited to, risk of infections, GI upset, mucositis and need for bone marrow/liver/kidney monitoring.  He is aware not to combine methotrexate with alcohol due to concerns for hepatotoxicity.  I  requested he repeat a CBC and CMP in 4 weeks after initiation of methotrexate to monitor for drug toxicities.  As the steroids have been ineffective I requested he taper off the Medrol by 4 mg every week.  He will also obtain hand and foot x-rays to assess for inflammatory changes.    - If he continues to be resistant to treatment I will refer him to hand orthopedics for an assessment of trigger fingers.    Orders:    methotrexate 2.5 MG tablet; Take 4 tabs once weekly x 2 weeks, then increase to 6 tabs once weekly x 2 weeks, then increase to 8 tabs once weekly. Do not start before May 5, 2025.    XR hand 3+ vw right; Future    XR hand 3+ vw left; Future    XR foot 3+ vw right; Future    XR foot 3+ vw left; Future    C-reactive protein; Future    Sedimentation rate, automated; Future

## 2025-05-02 NOTE — ASSESSMENT & PLAN NOTE
Orders:    CBC and differential; Future    Comprehensive metabolic panel; Future    folic acid (FOLVITE) 1 mg tablet; Take 1 tablet (1 mg total) by mouth daily

## 2025-05-02 NOTE — PROGRESS NOTES
Virtual Regular VisitName: Shaheen Nagy      : 1965      MRN: [id number]  Encounter Provider: Emy Rust MD  Encounter Date: 2025   Encounter department: [facility] [place]  :  Assessment & Plan  Seronegative rheumatoid arthritis (HCC)  Mr. Nagy is a 60-year-old male with history significant for lumbar degenerative arthritis who presents for a follow up of abrupt onset joint pains with stiffness and an elevated C-reactive protein of 37/polymyalgia rheumatica considered in .  He is currently on Medrol 16 mg once daily.      - Shaheen presents today for a follow up of abrupt onset diffuse arthralgias/stiffness with bilateral hand swelling he experienced on 3/21 in association with a significantly elevated C-reactive protein and steroid responsiveness [although to higher doses of prednisone].  Given this constellation of features at our initial visit I considered the possibility of polymyalgia rheumatica and recommended maintaining prednisone 20 mg once daily before starting a taper.    - He reports this dose of prednisone has been ineffective for him and he is still experiencing hand and foot cramping/locking and swelling that seems to be activity related.  Given ongoing complaints on 20 mg of prednisone with normalization of the inflammation markers his presentation no longer seems consistent with polymyalgia rheumatica.  His symptoms also appear atypical for an inflammatory arthritis, given that they are triggered with activities, with cramping as opposed to arthralgias and lack of responsiveness to this dose of steroids, but given the distribution of symptoms involving his hands and feet with occurrences of swelling (I requested he send me a picture on [employer] so I can review), I cannot rule out the possibility of seronegative rheumatoid arthritis and recommend initiation of DMARDs.      - I will start him on methotrexate titrated to 20 mg once weekly with  folic acid 1 mg daily.  I reviewed the side effects of methotrexate including but not limited to, risk of infections, GI upset, mucositis and need for bone marrow/liver/kidney monitoring.  He is aware not to combine methotrexate with alcohol due to concerns for hepatotoxicity.  I requested he repeat a CBC and CMP in 4 weeks after initiation of methotrexate to monitor for drug toxicities.  As the steroids have been ineffective I requested he taper off the Medrol by 4 mg every week.  He will also obtain hand and foot x-rays to assess for inflammatory changes.    - If he continues to be resistant to treatment I will refer him to hand orthopedics for an assessment of trigger fingers.    Orders:    methotrexate 2.5 MG tablet; Take 4 tabs once weekly x 2 weeks, then increase to 6 tabs once weekly x 2 weeks, then increase to 8 tabs once weekly. Do not start before May 5, 2025.    XR hand 3+ vw right; Future    XR hand 3+ vw left; Future    XR foot 3+ vw right; Future    XR foot 3+ vw left; Future    C-reactive protein; Future    Sedimentation rate, automated; Future    Methotrexate, long term, current use    Orders:    CBC and differential; Future    Comprehensive metabolic panel; Future    folic acid (FOLVITE) 1 mg tablet; Take 1 tablet (1 mg total) by mouth daily    Current chronic use of systemic steroids         Polymyalgia rheumatica (HCC)         Spondylosis of lumbar region without myelopathy or radiculopathy         Need for hepatitis C screening test    Orders:    Viral Hepatitis Screening and Diagnosis (HBV, HCV); Future    Screening-pulmonary TB    Orders:    QuantiFERON-TB Gold Plus [facility]; Future    Seronegative rheumatoid arthritis (HCC)         Methotrexate, long term, current use         Current chronic use of systemic steroids         Polymyalgia rheumatica (HCC)         Spondylosis of lumbar region without myelopathy or radiculopathy         Need for hepatitis C screening test         Screening-pulmonary  TB           Patient's rheumatologic disease(s) threaten long-term function if not appropriately managed.      History of Present Illness     HPI    INITIAL VISIT NOTE (4/2025):  Mr. Nagy is a 60-year-old male with history significant for lumbar degenerative arthritis who presents for an evaluation of abrupt onset joint pains with stiffness and an elevated C-reactive protein of 37.  He is referred by NEPTALI Busby for a rheumatology consult.     Patient reports for approximately 10 years he has been followed by orthopedic spine surgery for a longstanding history of lumbar degenerative arthritis and would receive periodic interventional procedures approximately every 2 years when his symptoms flared.  At times he would also receive short courses of oral steroids.  He mentions on 3/21 he started to experience a recurrence of spinal pain that was radiating into his neck.  He was seen by his orthopedic spine surgeon and started on a taper of oral prednisone.  He reports 2 days following this he woke up in the morning with significant joint pains affecting his hands, elbows, knees, ankles and feet associated with significant swelling of his bilateral hands and states by the next day due to the marked stiffness he was unable to move and his wife had to take him in to the medical office using a wheelchair.  He was seen by his PCP on 3/24 and was prescribed a new course of steroids starting at 60 mg with a taper of 20 mg every 3 days.  He reports within 2 days of taking the 60 mg once daily he felt much better and his symptoms had resolved.  He was also able to tolerate the 40 mg once daily without any issues, but states when he went down to 20 mg once daily he started to experience some degree of recurrence of his joint pains and as he was worried that his symptoms would escalate he contacted his PCP who advised him to increase the prednisone back to 40 mg once daily and he has been on this since last  week.     He reports on the current dose of 40 mg once daily he has been feeling well without any concerning joint pains, swelling or stiffness.  He mentions prior to the onset of his symptoms he had a mild viral upper respiratory tract infection.  No other illnesses/infections/immunizations.  He did also experience chills without any recorded fevers.  No history of new headaches, scalp tenderness, recent vision changes unrelated to refraction errors or jaw claudication.  No history of inflammatory eye disease, skin rash/psoriasis or inflammatory bowel disease.  He reports a history of osteoarthritis in his mother.     Labs done on 3/25 once he was already started on prednisone showed an elevated C-reactive protein of 37.  An DEE DEE screen, ESR, rheumatoid factor, uric acid, CK, CBC and CMP were normal.  He initially had a negative Lyme antibody profile, but this was repeated 1 week later and showed a positive IgG antibody with a negative IgM.  He cannot recall any bull's-eye rash or removal of the tick.  He is also being treated for possible Lyme disease and was started on doxycycline 100 mg twice daily for 3 weeks.      5/2/2025:  Patient presents for a follow up of possible polymyalgia rheumatica.  He is currently on Medrol 16 mg once daily.  I reviewed his testing done recently which shows normalization of the inflammatory markers.  Sjogren's antibodies, ANCA and anti-CCP antibody were negative.    At our last visit we discussed maintaining prednisone 20 mg once daily to see if this stabilized his joint pains, but he contacted the office on 4/21 reporting persistent hand pain/cramping with swelling for which Dr. Magallanes switched him to Medrol 16 mg once daily.  He reports this has also not been helping and he is continuing to experience bilateral hand cramps and locking sensation, not as much joint pain, that is activity triggered and occurs during the day.  He does not have symptoms in the morning.  Occasionally  his hands are also swelling and he will send me a picture on [employer].  There is no morning stiffness of his hands.  His feet are also cramping randomly and at nighttime he is experiencing cramps in his calves.  No other joint pains, swelling or stiffness.      Review of Systems  Constitutional: Negative for weight change, fevers, chills, night sweats, fatigue.  ENT/Mouth: Negative for hearing changes, ear pain, nasal congestion, sinus pain, hoarseness, sore throat, rhinorrhea, swallowing difficulty.   Eyes: Negative for pain, redness, discharge, vision changes.   Cardiovascular: Negative for chest pain, SOB, palpitations.   Respiratory: Negative for cough, sputum, wheezing, dyspnea.   Gastrointestinal: Negative for nausea, vomiting, diarrhea, constipation, pain, heartburn.  Genitourinary: Negative for dysuria, urinary frequency, hematuria.   Musculoskeletal: As per HPI.  Skin: Negative for skin rash, color changes.   Neuro: Negative for weakness, numbness, tingling, loss of consciousness.   Psych: Negative for anxiety, depression.   Heme/Lymph: Negative for easy bruising, bleeding, lymphadenopathy.      Objective   There were no vitals taken for this visit.    Physical Exam  General: Well appearing, well nourished, in no distress. Oriented x 3, normal mood and affect.  Skin: Good turgor, no rash, unusual bruising or prominent lesions.  Hair: Normal texture and distribution.  HEENT:  Head: Normocephalic, atraumatic.  Eyes: Conjunctiva clear, sclera non-icteric, EOM intact.  Nose: No external lesions.  Neck: Supple.  Neurologic: Alert and oriented. No focal neurological deficits appreciated.   Psychiatric: Normal mood and affect.      Administrative Statements   Encounter provider Emy Rust MD    The Patient is located at Home and in the following state in which I hold an active license [place].    The patient was identified by name and date of birth. Shaheen Nagy was informed that this is a telemedicine  visit and that the visit is being conducted through the Epic Embedded platform. He agrees to proceed..  My office door was closed. No one else was in the room.  He acknowledged consent and understanding of privacy and security of the video platform. The patient has agreed to participate and understands they can discontinue the visit at any time.    I have spent a total time of 30 minutes in caring for this patient on the day of the visit/encounter including Diagnostic results, Risks and benefits of tx options, Instructions for management, Patient and family education, Impressions, Documenting in the medical record, Reviewing/placing orders in the medical record (including tests, medications, and/or procedures), and Obtaining or reviewing history  , not including the time spent for establishing the audio/video connection.

## 2025-07-01 ENCOUNTER — TELEPHONE (OUTPATIENT)
Dept: RHEUMATOLOGY | Facility: CLINIC | Age: 60
End: 2025-07-01

## 2025-07-01 NOTE — TELEPHONE ENCOUNTER
Have made multiple attempts to reach out to patient about rescheduling appointment and have sent a MYC msg as well cancelling appointment.